# Patient Record
Sex: FEMALE | Race: WHITE | NOT HISPANIC OR LATINO | Employment: FULL TIME | URBAN - METROPOLITAN AREA
[De-identification: names, ages, dates, MRNs, and addresses within clinical notes are randomized per-mention and may not be internally consistent; named-entity substitution may affect disease eponyms.]

---
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
## 2023-04-04 ENCOUNTER — OFFICE VISIT (OUTPATIENT)
Dept: URGENT CARE | Facility: CLINIC | Age: 18
End: 2023-04-04
Payer: MEDICAID

## 2023-04-04 VITALS
RESPIRATION RATE: 18 BRPM | WEIGHT: 190 LBS | BODY MASS INDEX: 31.65 KG/M2 | SYSTOLIC BLOOD PRESSURE: 138 MMHG | HEIGHT: 65 IN | TEMPERATURE: 99 F | DIASTOLIC BLOOD PRESSURE: 91 MMHG | HEART RATE: 90 BPM | OXYGEN SATURATION: 99 %

## 2023-04-04 DIAGNOSIS — R11.2 NAUSEA AND VOMITING, UNSPECIFIED VOMITING TYPE: ICD-10-CM

## 2023-04-04 DIAGNOSIS — R10.31 RLQ ABDOMINAL PAIN: Primary | ICD-10-CM

## 2023-04-04 LAB
B-HCG UR QL: NEGATIVE
BILIRUB UR QL STRIP: NEGATIVE
CTP QC/QA: YES
GLUCOSE UR QL STRIP: NEGATIVE
KETONES UR QL STRIP: NEGATIVE
LEUKOCYTE ESTERASE UR QL STRIP: NEGATIVE
PH, POC UA: 8 (ref 5–8)
POC BLOOD, URINE: NEGATIVE
POC NITRATES, URINE: NEGATIVE
PROT UR QL STRIP: NEGATIVE
SP GR UR STRIP: 1 (ref 1–1.03)
UROBILINOGEN UR STRIP-ACNC: NORMAL (ref 0.1–1.1)

## 2023-04-04 PROCEDURE — 99215 OFFICE O/P EST HI 40 MIN: CPT | Mod: S$GLB,,, | Performed by: PHYSICIAN ASSISTANT

## 2023-04-04 PROCEDURE — 81025 POCT URINE PREGNANCY: ICD-10-PCS | Mod: S$GLB,,, | Performed by: PHYSICIAN ASSISTANT

## 2023-04-04 PROCEDURE — 81003 URINALYSIS AUTO W/O SCOPE: CPT | Mod: QW,S$GLB,, | Performed by: PHYSICIAN ASSISTANT

## 2023-04-04 PROCEDURE — 81025 URINE PREGNANCY TEST: CPT | Mod: S$GLB,,, | Performed by: PHYSICIAN ASSISTANT

## 2023-04-04 PROCEDURE — 81003 POCT URINALYSIS, DIPSTICK, AUTOMATED, W/O SCOPE: ICD-10-PCS | Mod: QW,S$GLB,, | Performed by: PHYSICIAN ASSISTANT

## 2023-04-04 PROCEDURE — 99215 PR OFFICE/OUTPT VISIT, EST, LEVL V, 40-54 MIN: ICD-10-PCS | Mod: S$GLB,,, | Performed by: PHYSICIAN ASSISTANT

## 2023-04-05 NOTE — PROGRESS NOTES
"Subjective:      Patient ID: Brenda Perez is a 17 y.o. female.    Vitals:  height is 5' 5" (1.651 m) and weight is 86.2 kg (190 lb). Her oral temperature is 99 °F (37.2 °C). Her blood pressure is 138/91 (abnormal) and her pulse is 90. Her respiration is 18 and oxygen saturation is 99%.     Chief Complaint: Abdominal Pain    Pt complaisn of abdominal pain on the right side of her abdomen. Pain started a few days ago and is not improving. Pt says she has been vomitting and having diarrhea the last few days on and off. Pt also states pain is worse when she palpates her stomach.    Abdominal Pain  This is a new problem. The current episode started in the past 7 days. The onset quality is sudden. The problem has been gradually worsening. The pain is located in the RLQ, RUQ and right flank. The pain is at a severity of 6/10. The pain is moderate. The quality of the pain is burning (stabbing). The abdominal pain radiates to the back. Associated symptoms include diarrhea and vomiting. Pertinent negatives include no anorexia, arthralgias, belching, constipation, dysuria, fever, flatus, frequency, headaches, hematochezia, hematuria, melena, myalgias, nausea or weight loss. The pain is aggravated by palpation. The pain is relieved by Nothing. She has tried acetaminophen for the symptoms. The treatment provided no relief. There is no history of abdominal surgery, colon cancer, Crohn's disease, gallstones, GERD, irritable bowel syndrome, pancreatitis, PUD or ulcerative colitis. Patient's medical history does not include kidney stones and UTI.     Constitution: Negative for fever.   Gastrointestinal:  Positive for abdominal pain, vomiting and diarrhea. Negative for history of abdominal surgery, nausea, constipation and bright red blood in stool.   Genitourinary:  Negative for dysuria, frequency and hematuria.   Musculoskeletal:  Negative for joint pain and muscle ache.   Neurological:  Negative for headaches.    Objective: "     Physical Exam   Constitutional: She is oriented to person, place, and time. She appears well-developed. She is cooperative.  Non-toxic appearance. She does not appear ill. No distress.   HENT:   Head: Normocephalic and atraumatic.   Ears:   Right Ear: Hearing normal.   Left Ear: Hearing normal.   Eyes: Conjunctivae and lids are normal. No scleral icterus.   Neck: Phonation normal. Neck supple.   Cardiovascular: Normal rate.   Pulmonary/Chest: Effort normal. No respiratory distress.   Abdominal: Normal appearance. She exhibits no distension and no mass. Soft. There is abdominal tenderness. There is guarding (voluntary) and tenderness at McBurney's point. There is no rebound, no left CVA tenderness and no right CVA tenderness. No hernia.     Neurological: She is alert and oriented to person, place, and time. Coordination normal.   Skin: Skin is intact, not diaphoretic and not pale.   Psychiatric: Her speech is normal and behavior is normal. Judgment and thought content normal.   Nursing note and vitals reviewed.    Assessment:     1. RLQ abdominal pain    2. Nausea and vomiting, unspecified vomiting type        Plan:       RLQ abdominal pain  -     POCT Urinalysis, Dipstick, Automated, W/O Scope  -     POCT urine pregnancy  -     Refer to Emergency Dept.    Nausea and vomiting, unspecified vomiting type  -     Refer to Emergency Dept.      Results for orders placed or performed in visit on 04/04/23   POCT Urinalysis, Dipstick, Automated, W/O Scope   Result Value Ref Range    POC Blood, Urine Negative Negative    POC Bilirubin, Urine Negative Negative    POC Urobilinogen, Urine norm 0.1 - 1.1    POC Ketones, Urine Negative Negative    POC Protein, Urine Negative Negative    POC Nitrates, Urine Negative Negative    POC Glucose, Urine Negative Negative    pH, UA 8.0 5 - 8    POC Specific Gravity, Urine 1.005 1.003 - 1.029    POC Leukocytes, Urine Negative Negative   POCT urine pregnancy   Result Value Ref Range     POC Preg Test, Ur Negative Negative     Acceptable Yes          Medical Decision Making:   History:   I obtained history from: someone other than patient.       <> Summary of History: mother  Clinical Tests:   Lab Tests: Ordered and Reviewed  The following lab test(s) were unremarkable: UPT and Urinalysis       <> Summary of Lab: UPT and UA WNL     Concerns for possible appendicitis. Recommend further evaluation and treatment for your RLQ abdominal pain. Due to the high risk of complications the patient is being sent to the emergency room for further evaluation, treatment and possible hospitalization.

## 2023-10-26 PROBLEM — R10.2 PELVIC PAIN IN FEMALE: Status: ACTIVE | Noted: 2023-10-26

## 2024-01-23 ENCOUNTER — OFFICE VISIT (OUTPATIENT)
Dept: URGENT CARE | Facility: CLINIC | Age: 19
End: 2024-01-23
Payer: MEDICAID

## 2024-01-23 VITALS
HEIGHT: 66 IN | SYSTOLIC BLOOD PRESSURE: 126 MMHG | HEART RATE: 66 BPM | OXYGEN SATURATION: 98 % | DIASTOLIC BLOOD PRESSURE: 84 MMHG | TEMPERATURE: 98 F | RESPIRATION RATE: 19 BRPM | WEIGHT: 182.13 LBS | BODY MASS INDEX: 29.27 KG/M2

## 2024-01-23 DIAGNOSIS — J02.9 VIRAL PHARYNGITIS: Primary | ICD-10-CM

## 2024-01-23 LAB
CTP QC/QA: YES
MOLECULAR STREP A: NEGATIVE

## 2024-01-23 PROCEDURE — 99214 OFFICE O/P EST MOD 30 MIN: CPT | Mod: S$GLB,,, | Performed by: NURSE PRACTITIONER

## 2024-01-23 PROCEDURE — 87651 STREP A DNA AMP PROBE: CPT | Mod: QW,S$GLB,, | Performed by: NURSE PRACTITIONER

## 2024-01-23 NOTE — PATIENT INSTRUCTIONS
1.  Take all medications as directed. If you have been prescribed antibiotics, make sure to complete them.   2.  Rest and keep yourself/patient well hydrated. For adults, it is recommended to drink at least 8-10 glasses of water daily.   3.  For patients above 6 months of age who are not allergic to and are not on anticoagulants, you can alternate Tylenol and Motrin every 4-6 hours for fever above 100.4F and/or pain.  For patients less than 6 months of age, allergic to or intolerant to NSAIDS, have gastritis, gastric ulcers, or history of GI bleeds, are pregnant, or are on anticoagulant therapy, you can take Tylenol every 4 hours as needed for fever above 100.4F and/or pain.   4. You should schedule a follow-up appointment with your Primary Care Provider/Pediatrician for recheck in 2-3 days or as directed at this visit.   5.  If your condition fails to improve in a timely manner, you should receive another evaluation by your Primary Care Provider/Pediatrician to discuss your concerns or return to urgent care for a recheck.  If your condition worsens at any time, you should report immediately to your nearest Emergency Department for further evaluation. **You must understand that you have received Urgent Care treatment only and that you may be released before all of your medical problems are known or treated. You, the patient, are responsible to arrange for follow-up care as instructed.     Patient Education       Viral Pharyngitis Discharge Instructions   About this topic   Viral pharyngitis is a sore throat which is likely caused by a virus. Most of the time, a sore throat will go away without antibiotics in a week or two. If you have strep throat, which is caused by bacteria, you will need to take an antibiotic.           What care is needed at home?   Ask your doctor what you need to do when you go home. Make sure you ask questions if you do not understand what the doctor says.  To help ease a sore throat you  can:  Use a sore throat spray.  Suck on hard candy or throat lozenges.  Gargle with warm saltwater a few times each day. Mix 1/2  teaspoon (2.5 grams) salt in 8 ounces (240 mL) of warm water  You may want to take medicine like acetaminophen, ibuprofen, or naproxen for pain.  If you decide to take over-the-counter cough or cold medicines, follow the directions on the label carefully. Be sure you do not take more than one medicine that contains acetaminophen. Also, if you have a heart problem or high blood pressure, check with your doctor before you take any of these medicines.  Wash your hands often. This will help keep others healthy.  Do not smoke. Stay away from others who are smoking.  What follow-up care is needed?   Your doctor may ask you to make visits to the office to check on your progress. Be sure to keep these visits.  What drugs may be needed?   The doctor may order drugs to:   Help with pain   Soothe the throat  Lower fever  Will physical activity be limited?   You may need to rest at home for 1 to 2 days or until you are feeling well.   What changes to diet are needed?   If your throat feels too sore to eat solid foods, you may drink water, juice, milk, milkshakes, or soups.  Do not drink sports drinks, soft drinks, or drinks with a lot of sugar. These may cause fluid loss and bother your throat.  Stay away from caffeine; acidic juices like orange juice or lemonade; and beer, wine, and mixed drinks (alcohol). These can worsen your signs.  What problems could happen?   Ear or sinus infection  Asthma attack  Lung problems like pneumonia or bronchitis  Abscess in the throat  Very bad fluid loss. This is dehydration.  What can be done to prevent this health problem?   Wash your hands often with soap and water for at least 20 seconds, especially after coughing or sneezing. Alcohol-based hand sanitizers also work to kill germs.  If you are sick, cover your mouth and nose with tissue when you cough or sneeze.  You can also cough or sneeze into your elbow. Throw away tissues in the trash and wash your hands after touching used tissues.  Do not get too close (kissing, hugging) to people who are sick.  Avoid going to crowded places. Avoid touching your eyes, mouth, or nose if you are around people.  Avoid sharing your towels or hankies with anyone who is sick. Clean often handled things like door handles, remotes, toys, and phones. Wipe them with a disinfectant.  Do not share knives and forks or drinking glasses with someone who has a sore throat. Wash these objects with hot, soapy water.  Get at least 6 to 8 hours of sleep each night.  Get a flu shot each year.  When do I need to call the doctor?   You have trouble breathing.  Your neck, tongue, or throat is swelling.  You are drooling because you cannot swallow your saliva.  You have very bad pain in your throat that keeps you from eating or drinking anything.  There are large, painful lumps in your neck.  You have blisters in the back of your throat.  Teach Back: Helping You Understand   The Teach Back Method helps you understand the information we are giving you. After you talk with the staff, tell them in your own words what you learned. This helps to make sure the staff has described each thing clearly. It also helps to explain things that may have been confusing. Before going home, make sure you can do these:  I can tell you about my condition.  I can tell you what may help ease my sore throat.  I can tell you what I can do to help avoid passing the infection to others.  I can tell you what I will do if I have trouble breathing, drooling, or swelling of the throat, tongue, or neck.  Where can I learn more?   American Academy of Otolaryngology - Head and Neck Surgery  https://www.enthealth.org/conditions/sore-throats/   CDC  https://www.cdc.gov/antibiotic-use/community/for-patients/common-illnesses/sore-throat.html   NHS  Choices  http://www.nhs.uk/conditions/Sore-throat/Pages/Introduction.aspx   Last Reviewed Date   2021-06-22  Consumer Information Use and Disclaimer   This information is not specific medical advice and does not replace information you receive from your health care provider. This is only a brief summary of general information. It does NOT include all information about conditions, illnesses, injuries, tests, procedures, treatments, therapies, discharge instructions or life-style choices that may apply to you. You must talk with your health care provider for complete information about your health and treatment options. This information should not be used to decide whether or not to accept your health care providers advice, instructions or recommendations. Only your health care provider has the knowledge and training to provide advice that is right for you.   Copyright   Copyright © 2021 [employer] and its affiliates and/or licensors. All rights reserved.

## 2024-01-23 NOTE — LETTER
January 23, 2024      [facility]  [address], [address]  [place] [address]  Phone: [phone]  Fax: [phone]       Patient: Brenda Perez   YOB: 2005  Date of Visit: 01/23/2024    To Whom It May Concern:    Candis Perez  was at [facility] on 01/23/2024. She may return to work/school on 01/24/2024 with no restrictions. If you have any questions or concerns, or if I can be of further assistance, please do not hesitate to contact me.    Sincerely,      Terrie Prado, NP

## 2024-01-23 NOTE — PROGRESS NOTES
"Subjective:      Patient ID: Brenda Perez is a 18 y.o. female.    Vitals:  height is 5' 6" (1.676 m) and weight is 82.6 kg (182 lb 1.6 oz). Her oral temperature is 98.4 °F (36.9 °C). Her blood pressure is 126/84 and her pulse is 66. Her respiration is 19 and oxygen saturation is 98%.     Chief Complaint: Sore Throat (Sore throat & cough - Entered by patient)    18 year old female reports sore throat for one day. She reports testing positive for strep and covid on January 12th. Pt reports she felt better initially, symptoms returned yesterday.     Sore Throat   This is a new problem. The current episode started yesterday. The problem has been unchanged. She has tried nothing for the symptoms.       Constitution: Negative.   HENT:  Positive for sore throat.    Neck: neck negative.   Cardiovascular: Negative.    Respiratory: Negative.        Objective:     Physical Exam   Constitutional: She is oriented to person, place, and time. She appears well-developed. She is cooperative.  Non-toxic appearance. She does not appear ill. No distress.   HENT:   Head: Normocephalic and atraumatic.   Ears:   Right Ear: Hearing, tympanic membrane, external ear and ear canal normal.   Left Ear: Hearing, tympanic membrane, external ear and ear canal normal.   Nose: Nose normal. No mucosal edema, rhinorrhea or nasal deformity. No epistaxis. Right sinus exhibits no maxillary sinus tenderness and no frontal sinus tenderness. Left sinus exhibits no maxillary sinus tenderness and no frontal sinus tenderness.   Mouth/Throat: Uvula is midline, oropharynx is clear and moist and mucous membranes are normal. No trismus in the jaw. Normal dentition. No uvula swelling. Cobblestoning (thick, yellow) present. No oropharyngeal exudate, posterior oropharyngeal edema or posterior oropharyngeal erythema.   Eyes: Conjunctivae and lids are normal. No scleral icterus.   Neck: Trachea normal and phonation normal. Neck supple. No edema present. No " erythema present. No neck rigidity present.   Cardiovascular: Normal rate, regular rhythm, normal heart sounds and normal pulses.   Pulmonary/Chest: Effort normal and breath sounds normal. No respiratory distress. She has no decreased breath sounds. She has no rhonchi.   Abdominal: Normal appearance.   Musculoskeletal: Normal range of motion.         General: No deformity. Normal range of motion.   Neurological: She is alert and oriented to person, place, and time. She exhibits normal muscle tone. Coordination normal.   Skin: Skin is warm, dry, intact, not diaphoretic and not pale.   Psychiatric: Her speech is normal and behavior is normal. Judgment and thought content normal.   Nursing note and vitals reviewed.      Assessment:     1. Viral pharyngitis        Plan:       Viral pharyngitis  -     POCT Strep A, Molecular      Results for orders placed or performed in visit on 01/23/24   POCT Strep A, Molecular   Result Value Ref Range    Molecular Strep A, POC Negative Negative     Acceptable Yes      Pt advised to use tylenol or motrin as directed. Mucinex otc as directed. Return if needed.    Patient Instructions   1.  Take all medications as directed. If you have been prescribed antibiotics, make sure to complete them.   2.  Rest and keep yourself/patient well hydrated. For adults, it is recommended to drink at least 8-10 glasses of water daily.   3.  For patients above 6 months of age who are not allergic to and are not on anticoagulants, you can alternate Tylenol and Motrin every 4-6 hours for fever above 100.4F and/or pain.  For patients less than 6 months of age, allergic to or intolerant to NSAIDS, have gastritis, gastric ulcers, or history of GI bleeds, are pregnant, or are on anticoagulant therapy, you can take Tylenol every 4 hours as needed for fever above 100.4F and/or pain.   4. You should schedule a follow-up appointment with your Primary Care Provider/Pediatrician for recheck in 2-3  days or as directed at this visit.   5.  If your condition fails to improve in a timely manner, you should receive another evaluation by your Primary Care Provider/Pediatrician to discuss your concerns or return to urgent care for a recheck.  If your condition worsens at any time, you should report immediately to your nearest Emergency Department for further evaluation. **You must understand that you have received Urgent Care treatment only and that you may be released before all of your medical problems are known or treated. You, the patient, are responsible to arrange for follow-up care as instructed.     Patient Education       Viral Pharyngitis Discharge Instructions   About this topic   Viral pharyngitis is a sore throat which is likely caused by a virus. Most of the time, a sore throat will go away without antibiotics in a week or two. If you have strep throat, which is caused by bacteria, you will need to take an antibiotic.           What care is needed at home?   Ask your doctor what you need to do when you go home. Make sure you ask questions if you do not understand what the doctor says.  To help ease a sore throat you can:  Use a sore throat spray.  Suck on hard candy or throat lozenges.  Gargle with warm saltwater a few times each day. Mix 1/2  teaspoon (2.5 grams) salt in 8 ounces (240 mL) of warm water  You may want to take medicine like acetaminophen, ibuprofen, or naproxen for pain.  If you decide to take over-the-counter cough or cold medicines, follow the directions on the label carefully. Be sure you do not take more than one medicine that contains acetaminophen. Also, if you have a heart problem or high blood pressure, check with your doctor before you take any of these medicines.  Wash your hands often. This will help keep others healthy.  Do not smoke. Stay away from others who are smoking.  What follow-up care is needed?   Your doctor may ask you to make visits to the office to check on your  progress. Be sure to keep these visits.  What drugs may be needed?   The doctor may order drugs to:   Help with pain   Soothe the throat  Lower fever  Will physical activity be limited?   You may need to rest at home for 1 to 2 days or until you are feeling well.   What changes to diet are needed?   If your throat feels too sore to eat solid foods, you may drink water, juice, milk, milkshakes, or soups.  Do not drink sports drinks, soft drinks, or drinks with a lot of sugar. These may cause fluid loss and bother your throat.  Stay away from caffeine; acidic juices like orange juice or lemonade; and beer, wine, and mixed drinks (alcohol). These can worsen your signs.  What problems could happen?   Ear or sinus infection  Asthma attack  Lung problems like pneumonia or bronchitis  Abscess in the throat  Very bad fluid loss. This is dehydration.  What can be done to prevent this health problem?   Wash your hands often with soap and water for at least 20 seconds, especially after coughing or sneezing. Alcohol-based hand sanitizers also work to kill germs.  If you are sick, cover your mouth and nose with tissue when you cough or sneeze. You can also cough or sneeze into your elbow. Throw away tissues in the trash and wash your hands after touching used tissues.  Do not get too close (kissing, hugging) to people who are sick.  Avoid going to crowded places. Avoid touching your eyes, mouth, or nose if you are around people.  Avoid sharing your towels or hankies with anyone who is sick. Clean often handled things like door handles, remotes, toys, and phones. Wipe them with a disinfectant.  Do not share knives and forks or drinking glasses with someone who has a sore throat. Wash these objects with hot, soapy water.  Get at least 6 to 8 hours of sleep each night.  Get a flu shot each year.  When do I need to call the doctor?   You have trouble breathing.  Your neck, tongue, or throat is swelling.  You are drooling because you  cannot swallow your saliva.  You have very bad pain in your throat that keeps you from eating or drinking anything.  There are large, painful lumps in your neck.  You have blisters in the back of your throat.  Teach Back: Helping You Understand   The Teach Back Method helps you understand the information we are giving you. After you talk with the staff, tell them in your own words what you learned. This helps to make sure the staff has described each thing clearly. It also helps to explain things that may have been confusing. Before going home, make sure you can do these:  I can tell you about my condition.  I can tell you what may help ease my sore throat.  I can tell you what I can do to help avoid passing the infection to others.  I can tell you what I will do if I have trouble breathing, drooling, or swelling of the throat, tongue, or neck.  Where can I learn more?   American Academy of Otolaryngology - Head and Neck Surgery  https://www.enthealth.org/conditions/sore-throats/   CDC  https://www.cdc.gov/antibiotic-use/community/for-patients/common-illnesses/sore-throat.html   NHS Choices  http://www.nhs.uk/conditions/Sore-throat/Pages/Introduction.aspx   Last Reviewed Date   2021-06-22  Consumer Information Use and Disclaimer   This information is not specific medical advice and does not replace information you receive from your health care provider. This is only a brief summary of general information. It does NOT include all information about conditions, illnesses, injuries, tests, procedures, treatments, therapies, discharge instructions or life-style choices that may apply to you. You must talk with your health care provider for complete information about your health and treatment options. This information should not be used to decide whether or not to accept your health care providers advice, instructions or recommendations. Only your health care provider has the knowledge and training to provide advice that  is right for you.   Copyright   Copyright © 2021 [employer] and its affiliates and/or licensors. All rights reserved.

## 2024-06-03 LAB
ABO + RH BLD: NORMAL
ANTIBODY SCREEN: NEGATIVE
C TRACH RRNA SPEC QL PROBE: NEGATIVE
GONORRHEA: NEGATIVE
HBV SURFACE AG SERPL QL IA: NEGATIVE
HCT VFR BLD AUTO: 42.6 %
HGB BLD-MCNC: 12.9 G/DL
HIV 1+2 AB+HIV1 P24 AG SERPL QL IA: NORMAL
MCV RBC AUTO: 92 FL (ref 82–108)
PLATELET # BLD AUTO: 326 K/UL (ref 150–399)
RPR: NON REACTIVE
RUBELLA IGG SCREEN: NORMAL

## 2024-08-16 ENCOUNTER — TELEPHONE (OUTPATIENT)
Dept: OBSTETRICS AND GYNECOLOGY | Facility: CLINIC | Age: 19
End: 2024-08-16
Payer: MEDICAID

## 2024-08-16 NOTE — TELEPHONE ENCOUNTER
----- Message from Elizabeth Herring sent at 8/16/2024  9:53 AM CDT -----  Contact: Self  Brenda Perez  MRN: [id number]  Home Phone      [phone]  Work Phone      Not on file.  Mobile          [phone]    Patient Care Team:  Dennys Feng MD as PCP - General (Family Medicine)  Rui Miller Jr., MD as Consulting Physician (Obstetrics and Gynecology)  OB? Yes, Unknown  What phone number can you be reached at? [phone]  Message: pt is currently 16 wks pregnant. Would like to transfer care to Dr Bridges

## 2024-08-16 NOTE — TELEPHONE ENCOUNTER
Pt called requesting an appt with Dr. Bridges, pt is a transfer from Dr. Crowley, pt has been scheduled and she v/u.

## 2024-08-19 ENCOUNTER — TELEPHONE (OUTPATIENT)
Dept: OBSTETRICS AND GYNECOLOGY | Facility: CLINIC | Age: 19
End: 2024-08-19

## 2024-08-19 ENCOUNTER — OFFICE VISIT (OUTPATIENT)
Dept: OBSTETRICS AND GYNECOLOGY | Facility: CLINIC | Age: 19
End: 2024-08-19
Payer: MEDICAID

## 2024-08-19 ENCOUNTER — TELEPHONE (OUTPATIENT)
Dept: OBSTETRICS AND GYNECOLOGY | Facility: CLINIC | Age: 19
End: 2024-08-19
Payer: MEDICAID

## 2024-08-19 VITALS
HEART RATE: 88 BPM | SYSTOLIC BLOOD PRESSURE: 126 MMHG | WEIGHT: 190.06 LBS | BODY MASS INDEX: 30.54 KG/M2 | DIASTOLIC BLOOD PRESSURE: 84 MMHG | HEIGHT: 66 IN

## 2024-08-19 DIAGNOSIS — O10.919 CHRONIC HYPERTENSION AFFECTING PREGNANCY: ICD-10-CM

## 2024-08-19 DIAGNOSIS — Z32.01 PREGNANCY EXAMINATION OR TEST, POSITIVE RESULT: Primary | ICD-10-CM

## 2024-08-19 DIAGNOSIS — O23.12 ACUTE CYSTITIS DURING PREGNANCY IN SECOND TRIMESTER: ICD-10-CM

## 2024-08-19 PROCEDURE — 3008F BODY MASS INDEX DOCD: CPT | Mod: CPTII,,, | Performed by: OBSTETRICS & GYNECOLOGY

## 2024-08-19 PROCEDURE — 99999 PR PBB SHADOW E&M-EST. PATIENT-LVL III: CPT | Mod: PBBFAC,,, | Performed by: OBSTETRICS & GYNECOLOGY

## 2024-08-19 PROCEDURE — 3074F SYST BP LT 130 MM HG: CPT | Mod: CPTII,,, | Performed by: OBSTETRICS & GYNECOLOGY

## 2024-08-19 PROCEDURE — 99204 OFFICE O/P NEW MOD 45 MIN: CPT | Mod: S$PBB,TH,, | Performed by: OBSTETRICS & GYNECOLOGY

## 2024-08-19 PROCEDURE — 3079F DIAST BP 80-89 MM HG: CPT | Mod: CPTII,,, | Performed by: OBSTETRICS & GYNECOLOGY

## 2024-08-19 PROCEDURE — 1159F MED LIST DOCD IN RCRD: CPT | Mod: CPTII,,, | Performed by: OBSTETRICS & GYNECOLOGY

## 2024-08-19 PROCEDURE — 1160F RVW MEDS BY RX/DR IN RCRD: CPT | Mod: CPTII,,, | Performed by: OBSTETRICS & GYNECOLOGY

## 2024-08-19 PROCEDURE — 99213 OFFICE O/P EST LOW 20 MIN: CPT | Mod: PBBFAC,TH | Performed by: OBSTETRICS & GYNECOLOGY

## 2024-08-19 RX ORDER — NIFEDIPINE 60 MG/1
TABLET, EXTENDED RELEASE ORAL
COMMUNITY

## 2024-08-19 RX ORDER — ONDANSETRON 4 MG/1
4 TABLET, ORALLY DISINTEGRATING ORAL
COMMUNITY
Start: 2024-07-23

## 2024-08-19 RX ORDER — NITROFURANTOIN 25; 75 MG/1; MG/1
100 CAPSULE ORAL 2 TIMES DAILY
Qty: 14 CAPSULE | Refills: 0 | Status: SHIPPED | OUTPATIENT
Start: 2024-08-19 | End: 2024-08-26

## 2024-08-19 RX ORDER — NAPROXEN SODIUM 220 MG/1
81 TABLET, FILM COATED ORAL DAILY
Qty: 30 TABLET | Refills: 11 | Status: SHIPPED | OUTPATIENT
Start: 2024-08-19 | End: 2025-08-19

## 2024-08-19 NOTE — PROGRESS NOTES
Subjective:   Patient ID: Brenda Perez is a 19 y.o. y.o. female.     Chief Complaint: Missed Menses       History of Present Illness:    Brenda presents today complaining of amenorrhea. She started having pain and some spotting with wiping today.  Patient's last menstrual period was 2024 (exact date).. Prior menstrual cycles have been regular. She reports  cramping and spotting as above . UPT is positive.     She had started PNC with [facility].   She reports EDC of 25    Past Medical History:   Diagnosis Date    COVID-2021    Hypertension     WITH PREGNANCY- MONITORING    Pregnancy      (vaginal delivery)    Seizures     childhood, grew out of them, per pt     Past Surgical History:   Procedure Laterality Date    DIAGNOSTIC LAPAROSCOPY N/A 10/26/2023    Procedure: LAPAROSCOPY, DIAGNOSTIC;  Surgeon: Rui Miller Jr., MD;  Location: [facility] OR;  Service: OB/GYN;  Laterality: N/A;  To follow 1st case per Dr. Grant    FULGURATION OF ENDOMETRIOSIS  10/26/2023    Procedure: FULGURATION, ENDOMETRIOSIS;  Surgeon: Rui Miller Jr., MD;  Location: [facility] OR;  Service: OB/GYN;;     Social History     Socioeconomic History    Marital status: Single   Tobacco Use    Smoking status: Never    Smokeless tobacco: Never   Substance and Sexual Activity    Alcohol use: Yes     Comment: occasional- not during pregnancy    Drug use: Never    Sexual activity: Yes     Partners: Male     Birth control/protection: None     Comment: single     Family History   Problem Relation Name Age of Onset    Colon cancer Maternal Grandfather      No Known Problems Father      No Known Problems Mother      No Known Problems Sister      Breast cancer Neg Hx      Ovarian cancer Neg Hx       OB History    Para Term  AB Living   3 2 2     2   SAB IAB Ectopic Multiple Live Births         0 2      # Outcome Date GA Lbr Mac/2nd Weight Sex Type Anes PTL Lv   3 Current            2 Term 10/31/22 37w0d / 00:07 3.062 kg (6  lb 12 oz) M Vag-Spont EPI N ALMITA   1 Term 03/08/21 37w1d / 01:25 3.16 kg (6 lb 15.5 oz) M Vag-Spont EPI N ALMITA         ROS:   Review of Systems   Constitutional:  Negative for chills, diaphoresis, fatigue, fever and unexpected weight change.   HENT:  Negative for congestion, hearing loss, postnasal drip, rhinorrhea, sinus pressure, sinus pain, sore throat and tinnitus.    Eyes:  Negative for pain, discharge and visual disturbance.   Respiratory:  Negative for apnea, cough, shortness of breath and wheezing.    Cardiovascular:  Negative for chest pain, palpitations and leg swelling.   Gastrointestinal:  Negative for abdominal pain, constipation, diarrhea, nausea and vomiting.   Endocrine: Negative for cold intolerance, heat intolerance, polydipsia, polyphagia and polyuria.   Genitourinary:  Positive for dysuria, flank pain, frequency and hematuria. Negative for difficulty urinating, dyspareunia, enuresis, genital sores, menstrual problem, pelvic pain, urgency, vaginal bleeding, vaginal discharge and vaginal pain.   Musculoskeletal:  Negative for arthralgias, back pain, joint swelling, myalgias, neck pain and neck stiffness.   Skin:  Negative for color change, pallor and rash.   Allergic/Immunologic: Negative for environmental allergies, food allergies and immunocompromised state.   Neurological:  Negative for dizziness, weakness, light-headedness, numbness and headaches.   Hematological:  Negative for adenopathy. Does not bruise/bleed easily.   Psychiatric/Behavioral:  Negative for agitation and confusion. The patient is not nervous/anxious.            Objective:   Vital Signs:  Vitals:    08/19/24 1432   BP: 126/84   Pulse: 88       Physical Exam:  General:  alert,normal appearing gravid female   Head: Normocephalic, atraumatic   Neck: Supple, Normal ROM   Respiratory: Normal effort   Neuro/Psych: Alert and oriented, appropriate mood and affect   Abdomen:   Soft, right flank tender but no CVA tenderness; FHTs 160    Pelvis: External genitalia: normal general appearance  Vaginal: normal mucosa without prolapse or lesions, discharge, white  Cervix: normal appearance  Uterus:  gravid         Assessment:      1. Pregnancy examination or test, positive result    2. Acute cystitis during pregnancy in second trimester    3. Chronic hypertension affecting pregnancy          Plan:        Pregnancy examination or test, positive result  -     US OB/GYN Procedure ([facility]) - Extended List; Future; Expected date: 09/19/2024    Acute cystitis during pregnancy in second trimester  -     nitrofurantoin, macrocrystal-monohydrate, (MACROBID) 100 MG capsule; Take 1 capsule (100 mg total) by mouth 2 (two) times daily. for 7 days  Dispense: 14 capsule; Refill: 0    Chronic hypertension affecting pregnancy  -     aspirin 81 MG Chew; Take 1 tablet (81 mg total) by mouth once daily.  Dispense: 30 tablet; Refill: 11        1. Pap is up to date - reviewed  2. Prenatal labs have been done, will request records.  3. Schedule Ultrasound - anatomy  4. Follow up in 4 weeks after ultrasound.    On Procardia 60XL daily    Patient was counseled today on proper weight gain based on the [employer]'s recommendations based on her pre-pregnancy weight. Discussed foods to avoid in pregnancy (i.e. sushi, fish that are high in mercury, deli meat, and unpasteurized cheeses). Discussed prenatal vitamin options (i.e. stool softener, DHA). She was also counseled on safe, healthy behavior as well as medications safe in pregnancy.

## 2024-08-19 NOTE — TELEPHONE ENCOUNTER
----- Message from Ramila Barrett MA sent at 8/19/2024  9:21 AM CDT -----  Contact: self  Brenda Perez  MRN: [id number]  Home Phone      [phone]  Work Phone      Not on file.  Mobile          [phone]    Patient Care Team:  Dennys Feng MD as PCP - General (Family Medicine)  Rui Miller Jr., MD as Consulting Physician (Obstetrics and Gynecology)  OB? No  What phone number can you be reached at? [phone]  Message: Needs to see about rescheduling appt that she has on Thursday.  Stated was offered appt for tomorrow, wants to know if that is still possible.

## 2024-08-19 NOTE — TELEPHONE ENCOUNTER
Pt was informed that the appt is no longer available and she stated she will keep her appt on Thursday.

## 2024-08-26 ENCOUNTER — E-VISIT (OUTPATIENT)
Dept: OBSTETRICS AND GYNECOLOGY | Facility: CLINIC | Age: 19
End: 2024-08-26
Payer: MEDICAID

## 2024-08-26 ENCOUNTER — TELEPHONE (OUTPATIENT)
Dept: OBSTETRICS AND GYNECOLOGY | Facility: CLINIC | Age: 19
End: 2024-08-26
Payer: MEDICAID

## 2024-08-26 DIAGNOSIS — O23.42 URINARY TRACT INFECTION IN MOTHER DURING SECOND TRIMESTER OF PREGNANCY: Primary | ICD-10-CM

## 2024-08-26 DIAGNOSIS — R10.9 ACUTE RIGHT FLANK PAIN: Primary | ICD-10-CM

## 2024-08-26 RX ORDER — CEPHALEXIN 750 MG/1
750 CAPSULE ORAL EVERY 12 HOURS
Qty: 14 CAPSULE | Refills: 0 | Status: SHIPPED | OUTPATIENT
Start: 2024-08-26 | End: 2024-09-02

## 2024-08-26 NOTE — TELEPHONE ENCOUNTER
----- Message from Elizabeth Herring sent at 8/26/2024  9:18 AM CDT -----  Contact: Self  Brenda Perez  MRN: [id number]  Home Phone      Not on file.  Work Phone      Not on file.  Mobile          [phone]    Patient Care Team:  Dennys Feng MD as PCP - General (Family Medicine)  Rui Miller Jr., MD as Consulting Physician (Obstetrics and Gynecology)  Marley Bridges MD as Consulting Physician (Obstetrics and Gynecology)  OB? Yes, 17w2d  What phone number can you be reached at? [phone]  Message: pt states she took all medication prescribed for UTI but did a home test, and still has it. Also states that she is having some pain in her right abdominal area towards back.

## 2024-08-26 NOTE — TELEPHONE ENCOUNTER
Pt called back and complained of right-sided flank pain and has completed macrobid that was sent in. Pt stated vaginal bleeding resolved since last visit and denied dysuria. Pt had positive at-home UTI test. Offered e-visit and pt accepted. Portal message sent with link and pt voiced understanding.

## 2024-08-26 NOTE — PROGRESS NOTES
Patient ID: Brenda Perez is a 19 y.o. female.    Chief Complaint: Urinary Tract Infection (Entered automatically based on patient selection in [employer].)    The patient initiated a request through [employer] on 8/26/2024 for evaluation and management with a chief complaint of Urinary Tract Infection (Entered automatically based on patient selection in [employer].)     I evaluated the questionnaire submission on 08/26/2024  .    [facility] Peq Evisit Uti Questionnaire    8/26/2024 10:20 AM CDT - Filed by Patient   Do you agree to participate in an E-Visit? Yes   If you have any of the following symptoms, please present to your local emergency room or call 911:  I acknowledge   Choose the state of your primary residence [place]   Are you pregnant, could you be pregnant, or are you breast feeding? Pregnant   What is the main issue you would like addressed today? I tested postive for a uti on the 19th . I was given nitrofurantoin , tan finished that . Having very bad side pain , cramping , and pain in lower back on right side . Tested myself with a uti strip this morning and came back postivie .   What symptoms do you currently have? Pain while passing urine;  Difficulty passing urine;  Change in urine appearance or smell   When did your symptoms first appear? 7/10/2024   List what you have done or taken to help your symptoms. Nitrofurantoin , and heating pack on side and back   Please indicate whether you have had the following symptoms during the past 24 hours     Urgent urination (a sudden and uncontrollable urge to urinate) None   Frequent urination of small amounts of urine (going to the toilet very often) Mild   Burning pain when urinating Mild   Incomplete bladder emptying (still feel like you need to urinate again after urination) Mild   Pain not associated with urination in the lower abdomen below the belly button) Mild   What does your urine look like? Cloudy   Blood seen in the urine None   Flank pain (pain in one  or both sides of the lower back) Severe   Abnormal Vaginal Discharge (abnormal amount, color and/or odor)    Discharge from the urethra (urinary opening) without urination Mild   High body temperature/fever? None-<99.5   Please rate how much discomfort you have experience because of the symptoms in the past 24 hours: Severe   Please indicate how the symptoms have interfered with your every day activities/work in the past 24 hours: Moderate   Please indicate how these symptoms have interfered with your social activities (visiting people, meeting with friends, etc.) in the past 24 hours? None   Are you a diabetic? No   Please indicate whether you have the following at the time of completion of this questionnaire: Pregnancy   Provide any additional information you feel is important.    Please attach any relevant images or files (if you have performed a home test for UTI, please submit a photo of results)    Are you able to take your vital signs? No         Encounter Diagnosis   Name Primary?    Urinary tract infection in mother during second trimester of pregnancy Yes        No orders of the defined types were placed in this encounter.     Medications Ordered This Encounter   Medications    cephALEXin (KEFLEX) 750 MG capsule     Sig: Take 1 capsule (750 mg total) by mouth every 12 (twelve) hours. for 7 days     Dispense:  14 capsule     Refill:  0        No follow-ups on file.      E-Visit Time Tracking:    Day 1 Time (in minutes): 7    Total Time (in minutes): 7

## 2024-09-17 ENCOUNTER — PROCEDURE VISIT (OUTPATIENT)
Dept: MATERNAL FETAL MEDICINE | Facility: CLINIC | Age: 19
End: 2024-09-17
Payer: MEDICAID

## 2024-09-17 ENCOUNTER — ROUTINE PRENATAL (OUTPATIENT)
Dept: OBSTETRICS AND GYNECOLOGY | Facility: CLINIC | Age: 19
End: 2024-09-17
Payer: MEDICAID

## 2024-09-17 VITALS
SYSTOLIC BLOOD PRESSURE: 128 MMHG | HEART RATE: 85 BPM | BODY MASS INDEX: 30.83 KG/M2 | DIASTOLIC BLOOD PRESSURE: 80 MMHG | WEIGHT: 191 LBS

## 2024-09-17 DIAGNOSIS — N89.8 VAGINAL DISCHARGE DURING PREGNANCY IN SECOND TRIMESTER: ICD-10-CM

## 2024-09-17 DIAGNOSIS — O26.892 VAGINAL DISCHARGE DURING PREGNANCY IN SECOND TRIMESTER: ICD-10-CM

## 2024-09-17 DIAGNOSIS — Z34.82 ENCOUNTER FOR SUPERVISION OF OTHER NORMAL PREGNANCY IN SECOND TRIMESTER: Primary | ICD-10-CM

## 2024-09-17 DIAGNOSIS — Z3A.20 20 WEEKS GESTATION OF PREGNANCY: ICD-10-CM

## 2024-09-17 DIAGNOSIS — Z32.01 PREGNANCY EXAMINATION OR TEST, POSITIVE RESULT: ICD-10-CM

## 2024-09-17 PROCEDURE — 81514 NFCT DS BV&VAGINITIS DNA ALG: CPT | Performed by: OBSTETRICS & GYNECOLOGY

## 2024-09-17 PROCEDURE — 99213 OFFICE O/P EST LOW 20 MIN: CPT | Mod: TH,S$PBB,, | Performed by: OBSTETRICS & GYNECOLOGY

## 2024-09-17 PROCEDURE — 99212 OFFICE O/P EST SF 10 MIN: CPT | Mod: PBBFAC,TH,25 | Performed by: OBSTETRICS & GYNECOLOGY

## 2024-09-17 PROCEDURE — 76805 OB US >/= 14 WKS SNGL FETUS: CPT | Mod: PBBFAC | Performed by: OBSTETRICS & GYNECOLOGY

## 2024-09-17 PROCEDURE — 99999 PR PBB SHADOW E&M-EST. PATIENT-LVL II: CPT | Mod: PBBFAC,,, | Performed by: OBSTETRICS & GYNECOLOGY

## 2024-09-17 NOTE — PROGRESS NOTES
Patient with pelvic pain and irritation, burning.  Vaginal discharge present, affirm collected.  Urine dip normal.      Denies vaginal bleeding or cramping.  Good FM. RTC in 4 weeks.     Anatomy today, needs follow up.     Need prenatal records from Dr. Miller.    Vitals signs, FHTs, urine dip, and PE findings documented, reviewed and available in OB flow chart.       I spent a total of 20 minutes on the day of the visit.This includes face to face time and non-face to face time preparing to see the patient (eg, review of tests), Obtaining and/or reviewing separately obtained history, Documenting clinical information in the electronic or other health record, Independently interpreting resultsand communicating results to the patient/family/caregiver, or Care coordination.     Coffective counseling sheet Learn Your Baby and Protect Breastfeeding discussed with mother. Instructed regarding feeding cues and methods to calm baby. Encouraged mother to download Coffective mobile terese if she has not already done so.  Mother verbalized understanding.

## 2024-09-20 ENCOUNTER — PATIENT MESSAGE (OUTPATIENT)
Dept: OBSTETRICS AND GYNECOLOGY | Facility: CLINIC | Age: 19
End: 2024-09-20
Payer: MEDICAID

## 2024-09-20 LAB
BACTERIAL VAGINOSIS DNA: NEGATIVE
CANDIDA GLABRATA DNA: NEGATIVE
CANDIDA KRUSEI DNA: NEGATIVE
CANDIDA RRNA VAG QL PROBE: NEGATIVE
T VAGINALIS RRNA GENITAL QL PROBE: NEGATIVE

## 2024-09-24 ENCOUNTER — PATIENT MESSAGE (OUTPATIENT)
Dept: OBSTETRICS AND GYNECOLOGY | Facility: CLINIC | Age: 19
End: 2024-09-24
Payer: MEDICAID

## 2024-10-09 ENCOUNTER — TELEPHONE (OUTPATIENT)
Dept: OBSTETRICS AND GYNECOLOGY | Facility: CLINIC | Age: 19
End: 2024-10-09
Payer: MEDICAID

## 2024-10-09 NOTE — TELEPHONE ENCOUNTER
Informed by  pt on phone with complaints of cramping and back pain since last night. Before transferring pt, pt hung up.    Pt was called and she complaints of intermittent cramping to lower stomach occasionally going into back starting last night. Pt denied urinary complaints. Pt stated she drank about 6 bottles of water since this morning, but can't drink too much because it would make her throw up. Pt denied vaginal bleeding and reported good fetal movement. Pt asked if she can try anything before being evaluated. Instructed pt to try increasing her water intake and wear maternity belt to see if symptoms improve/resolve. Pt instructed to contact clinic if symptoms do not improve, worsen, and/or she starts with vaginal bleeding. Pt voiced understanding.

## 2024-10-12 ENCOUNTER — PATIENT MESSAGE (OUTPATIENT)
Dept: OTHER | Facility: OTHER | Age: 19
End: 2024-10-12
Payer: MEDICAID

## 2024-10-15 ENCOUNTER — ROUTINE PRENATAL (OUTPATIENT)
Dept: OBSTETRICS AND GYNECOLOGY | Facility: CLINIC | Age: 19
End: 2024-10-15
Payer: MEDICAID

## 2024-10-15 VITALS
BODY MASS INDEX: 31.02 KG/M2 | DIASTOLIC BLOOD PRESSURE: 84 MMHG | SYSTOLIC BLOOD PRESSURE: 128 MMHG | HEART RATE: 91 BPM | WEIGHT: 192.19 LBS

## 2024-10-15 DIAGNOSIS — Z34.82 ENCOUNTER FOR SUPERVISION OF OTHER NORMAL PREGNANCY IN SECOND TRIMESTER: Primary | ICD-10-CM

## 2024-10-15 DIAGNOSIS — Z3A.24 24 WEEKS GESTATION OF PREGNANCY: ICD-10-CM

## 2024-10-15 PROCEDURE — 99999 PR PBB SHADOW E&M-EST. PATIENT-LVL II: CPT | Mod: PBBFAC,,, | Performed by: OBSTETRICS & GYNECOLOGY

## 2024-10-15 PROCEDURE — 99213 OFFICE O/P EST LOW 20 MIN: CPT | Mod: TH,S$PBB,, | Performed by: OBSTETRICS & GYNECOLOGY

## 2024-10-15 PROCEDURE — 99212 OFFICE O/P EST SF 10 MIN: CPT | Mod: PBBFAC,TH | Performed by: OBSTETRICS & GYNECOLOGY

## 2024-10-15 NOTE — PROGRESS NOTES
Patient with no complaints. Denies vaginal bleeding or contractions. Good FM. GTT/CBC/RPR and indirect jag (if applicable) ordered today.   labor precautions discussed with patient. RTC in 4 weeks.     Declines flu shot.    Vitals signs, FHTs, urine dip, and PE findings documented, reviewed and available in OB flow chart.       I spent a total of 20 minutes on the day of the visit.This includes face to face time and non-face to face time preparing to see the patient (eg, review of tests), Obtaining and/or reviewing separately obtained history, Documenting clinical information in the electronic or other health record, Independently interpreting resultsand communicating results to the patient/family/caregiver, or Care coordination.     Coffective counseling sheet Nourish discussed with mother. Reinforced basic breastfeeding position and latch as well as proper hand expression technique and avoidance of artificial nipples and formula unless medically indicated. Encouraged mother to download Coffective mobile terese if she has not already done so.  Mother verbalizes understanding.

## 2024-10-26 ENCOUNTER — PATIENT MESSAGE (OUTPATIENT)
Dept: OTHER | Facility: OTHER | Age: 19
End: 2024-10-26
Payer: MEDICAID

## 2024-10-29 ENCOUNTER — PATIENT MESSAGE (OUTPATIENT)
Dept: OBSTETRICS AND GYNECOLOGY | Facility: CLINIC | Age: 19
End: 2024-10-29
Payer: MEDICAID

## 2024-11-01 ENCOUNTER — PROCEDURE VISIT (OUTPATIENT)
Dept: MATERNAL FETAL MEDICINE | Facility: CLINIC | Age: 19
End: 2024-11-01
Payer: MEDICAID

## 2024-11-01 DIAGNOSIS — Z34.82 ENCOUNTER FOR SUPERVISION OF OTHER NORMAL PREGNANCY IN SECOND TRIMESTER: ICD-10-CM

## 2024-11-01 PROCEDURE — 76816 OB US FOLLOW-UP PER FETUS: CPT | Mod: PBBFAC | Performed by: OBSTETRICS & GYNECOLOGY

## 2024-11-02 DIAGNOSIS — Z36.2 ENCOUNTER FOR FOLLOW-UP ULTRASOUND OF FETAL ANATOMY: Primary | ICD-10-CM

## 2024-11-05 ENCOUNTER — PATIENT MESSAGE (OUTPATIENT)
Dept: OBSTETRICS AND GYNECOLOGY | Facility: CLINIC | Age: 19
End: 2024-11-05
Payer: MEDICAID

## 2024-11-09 ENCOUNTER — PATIENT MESSAGE (OUTPATIENT)
Dept: OTHER | Facility: OTHER | Age: 19
End: 2024-11-09
Payer: MEDICAID

## 2024-11-11 ENCOUNTER — PATIENT MESSAGE (OUTPATIENT)
Dept: OBSTETRICS AND GYNECOLOGY | Facility: CLINIC | Age: 19
End: 2024-11-11
Payer: MEDICAID

## 2024-11-12 ENCOUNTER — ROUTINE PRENATAL (OUTPATIENT)
Dept: OBSTETRICS AND GYNECOLOGY | Facility: CLINIC | Age: 19
End: 2024-11-12
Payer: MEDICAID

## 2024-11-12 VITALS
DIASTOLIC BLOOD PRESSURE: 86 MMHG | SYSTOLIC BLOOD PRESSURE: 134 MMHG | HEART RATE: 95 BPM | BODY MASS INDEX: 31.6 KG/M2 | WEIGHT: 195.81 LBS

## 2024-11-12 DIAGNOSIS — Z34.83 ENCOUNTER FOR SUPERVISION OF OTHER NORMAL PREGNANCY, THIRD TRIMESTER: Primary | ICD-10-CM

## 2024-11-12 DIAGNOSIS — Z3A.28 28 WEEKS GESTATION OF PREGNANCY: ICD-10-CM

## 2024-11-12 PROCEDURE — 99999 PR PBB SHADOW E&M-EST. PATIENT-LVL II: CPT | Mod: PBBFAC,,, | Performed by: OBSTETRICS & GYNECOLOGY

## 2024-11-12 PROCEDURE — 99212 OFFICE O/P EST SF 10 MIN: CPT | Mod: PBBFAC,TH | Performed by: OBSTETRICS & GYNECOLOGY

## 2024-11-12 PROCEDURE — 99213 OFFICE O/P EST LOW 20 MIN: CPT | Mod: TH,S$PBB,, | Performed by: OBSTETRICS & GYNECOLOGY

## 2024-11-12 NOTE — PROGRESS NOTES
Patient with no complaints. Denies vaginal bleeding or contractions. Good FM. Tdap discussed and ordered today.  Discussed fetal kick count instructions with patient to monitor fetal movement. RTC in 2 weeks.      Prenatals obtained  B+/-  Rubella non-immune  HIV negative  GC/CT negative   HepB negative    Will be scanned to medica.     Vitals signs, FHTs, urine dip, and PE findings documented, reviewed and available in OB flow chart.       I spent a total of 20 minutes on the day of the visit.This includes face to face time and non-face to face time preparing to see the patient (eg, review of tests), Obtaining and/or reviewing separately obtained history, Documenting clinical information in the electronic or other health record, Independently interpreting resultsand communicating results to the patient/family/caregiver, or Care coordination.     Coffective counseling sheet Keep Baby Close discussed with mother. Reinforced rooming in practices, continued skin to skin, and quiet hours as requested by mother.  Encouraged mother to download Coffective mobile terese if she has not already done so. Mother verbalizes understanding.

## 2024-11-15 ENCOUNTER — LAB VISIT (OUTPATIENT)
Dept: LAB | Facility: HOSPITAL | Age: 19
End: 2024-11-15
Attending: OBSTETRICS & GYNECOLOGY
Payer: MEDICAID

## 2024-11-15 DIAGNOSIS — Z34.82 ENCOUNTER FOR SUPERVISION OF OTHER NORMAL PREGNANCY IN SECOND TRIMESTER: ICD-10-CM

## 2024-11-15 LAB
BASOPHILS # BLD AUTO: 0.03 K/UL (ref 0–0.2)
BASOPHILS NFR BLD: 0.4 % (ref 0–1.9)
DIFFERENTIAL METHOD BLD: ABNORMAL
EOSINOPHIL # BLD AUTO: 0.2 K/UL (ref 0–0.5)
EOSINOPHIL NFR BLD: 1.8 % (ref 0–8)
ERYTHROCYTE [DISTWIDTH] IN BLOOD BY AUTOMATED COUNT: 13.4 % (ref 11.5–14.5)
GLUCOSE SERPL-MCNC: 99 MG/DL (ref 70–140)
HCT VFR BLD AUTO: 31.9 % (ref 37–48.5)
HGB BLD-MCNC: 10.9 G/DL (ref 12–16)
IMM GRANULOCYTES # BLD AUTO: 0.04 K/UL (ref 0–0.04)
IMM GRANULOCYTES NFR BLD AUTO: 0.5 % (ref 0–0.5)
LYMPHOCYTES # BLD AUTO: 2 K/UL (ref 1–4.8)
LYMPHOCYTES NFR BLD: 23 % (ref 18–48)
MCH RBC QN AUTO: 28.2 PG (ref 27–31)
MCHC RBC AUTO-ENTMCNC: 34.2 G/DL (ref 32–36)
MCV RBC AUTO: 83 FL (ref 82–98)
MONOCYTES # BLD AUTO: 0.6 K/UL (ref 0.3–1)
MONOCYTES NFR BLD: 6.7 % (ref 4–15)
NEUTROPHILS # BLD AUTO: 5.8 K/UL (ref 1.8–7.7)
NEUTROPHILS NFR BLD: 67.6 % (ref 38–73)
NRBC BLD-RTO: 0 /100 WBC
PLATELET # BLD AUTO: 192 K/UL (ref 150–450)
PMV BLD AUTO: 9.4 FL (ref 9.2–12.9)
RBC # BLD AUTO: 3.86 M/UL (ref 4–5.4)
TREPONEMA PALLIDUM IGG+IGM AB [PRESENCE] IN SERUM OR PLASMA BY IMMUNOASSAY: NONREACTIVE
WBC # BLD AUTO: 8.52 K/UL (ref 3.9–12.7)

## 2024-11-15 PROCEDURE — 85025 COMPLETE CBC W/AUTO DIFF WBC: CPT | Performed by: OBSTETRICS & GYNECOLOGY

## 2024-11-15 PROCEDURE — 36415 COLL VENOUS BLD VENIPUNCTURE: CPT | Performed by: OBSTETRICS & GYNECOLOGY

## 2024-11-15 PROCEDURE — 86593 SYPHILIS TEST NON-TREP QUANT: CPT | Performed by: OBSTETRICS & GYNECOLOGY

## 2024-11-15 PROCEDURE — 82950 GLUCOSE TEST: CPT | Performed by: OBSTETRICS & GYNECOLOGY

## 2024-11-23 ENCOUNTER — PATIENT MESSAGE (OUTPATIENT)
Dept: OTHER | Facility: OTHER | Age: 19
End: 2024-11-23
Payer: MEDICAID

## 2024-11-27 ENCOUNTER — ROUTINE PRENATAL (OUTPATIENT)
Dept: OBSTETRICS AND GYNECOLOGY | Facility: CLINIC | Age: 19
End: 2024-11-27
Payer: MEDICAID

## 2024-11-27 ENCOUNTER — LAB VISIT (OUTPATIENT)
Dept: LAB | Facility: HOSPITAL | Age: 19
End: 2024-11-27
Attending: OBSTETRICS & GYNECOLOGY
Payer: MEDICAID

## 2024-11-27 VITALS
DIASTOLIC BLOOD PRESSURE: 82 MMHG | WEIGHT: 197.63 LBS | SYSTOLIC BLOOD PRESSURE: 132 MMHG | HEART RATE: 90 BPM | BODY MASS INDEX: 31.89 KG/M2

## 2024-11-27 DIAGNOSIS — Z3A.30 30 WEEKS GESTATION OF PREGNANCY: ICD-10-CM

## 2024-11-27 DIAGNOSIS — O09.893 SUPERVISION OF OTHER HIGH RISK PREGNANCIES, THIRD TRIMESTER: Primary | ICD-10-CM

## 2024-11-27 DIAGNOSIS — O10.913 CHRONIC HYPERTENSION COMPLICATING OR REASON FOR CARE DURING PREGNANCY, THIRD TRIMESTER: ICD-10-CM

## 2024-11-27 LAB
ALBUMIN SERPL BCP-MCNC: 2.9 G/DL (ref 3.5–5.2)
ALP SERPL-CCNC: 119 U/L (ref 40–150)
ALT SERPL W/O P-5'-P-CCNC: 9 U/L (ref 10–44)
ANION GAP SERPL CALC-SCNC: 8 MMOL/L (ref 8–16)
AST SERPL-CCNC: 10 U/L (ref 10–40)
BASOPHILS # BLD AUTO: 0.03 K/UL (ref 0–0.2)
BASOPHILS NFR BLD: 0.3 % (ref 0–1.9)
BILIRUB SERPL-MCNC: 0.3 MG/DL (ref 0.1–1)
BUN SERPL-MCNC: 4 MG/DL (ref 6–20)
CALCIUM SERPL-MCNC: 8.9 MG/DL (ref 8.7–10.5)
CHLORIDE SERPL-SCNC: 107 MMOL/L (ref 95–110)
CO2 SERPL-SCNC: 22 MMOL/L (ref 23–29)
CREAT SERPL-MCNC: 0.5 MG/DL (ref 0.5–1.4)
CREAT UR-MCNC: 30.1 MG/DL (ref 15–325)
DIFFERENTIAL METHOD BLD: ABNORMAL
EOSINOPHIL # BLD AUTO: 0.2 K/UL (ref 0–0.5)
EOSINOPHIL NFR BLD: 2.3 % (ref 0–8)
ERYTHROCYTE [DISTWIDTH] IN BLOOD BY AUTOMATED COUNT: 13.2 % (ref 11.5–14.5)
EST. GFR  (NO RACE VARIABLE): >60 ML/MIN/1.73 M^2
GLUCOSE SERPL-MCNC: 88 MG/DL (ref 70–110)
HCT VFR BLD AUTO: 31 % (ref 37–48.5)
HGB BLD-MCNC: 10.5 G/DL (ref 12–16)
IMM GRANULOCYTES # BLD AUTO: 0.06 K/UL (ref 0–0.04)
IMM GRANULOCYTES NFR BLD AUTO: 0.6 % (ref 0–0.5)
LYMPHOCYTES # BLD AUTO: 2.1 K/UL (ref 1–4.8)
LYMPHOCYTES NFR BLD: 19.8 % (ref 18–48)
MCH RBC QN AUTO: 27.6 PG (ref 27–31)
MCHC RBC AUTO-ENTMCNC: 33.9 G/DL (ref 32–36)
MCV RBC AUTO: 82 FL (ref 82–98)
MONOCYTES # BLD AUTO: 0.6 K/UL (ref 0.3–1)
MONOCYTES NFR BLD: 5.9 % (ref 4–15)
NEUTROPHILS # BLD AUTO: 7.5 K/UL (ref 1.8–7.7)
NEUTROPHILS NFR BLD: 71.1 % (ref 38–73)
NRBC BLD-RTO: 0 /100 WBC
PLATELET # BLD AUTO: 211 K/UL (ref 150–450)
PMV BLD AUTO: 9.5 FL (ref 9.2–12.9)
POTASSIUM SERPL-SCNC: 3.5 MMOL/L (ref 3.5–5.1)
PROT SERPL-MCNC: 6.2 G/DL (ref 6–8.4)
PROT UR-MCNC: <7 MG/DL (ref 0–15)
PROT/CREAT UR: NORMAL MG/G{CREAT} (ref 0–0.2)
RBC # BLD AUTO: 3.8 M/UL (ref 4–5.4)
SODIUM SERPL-SCNC: 137 MMOL/L (ref 136–145)
WBC # BLD AUTO: 10.51 K/UL (ref 3.9–12.7)

## 2024-11-27 PROCEDURE — 84156 ASSAY OF PROTEIN URINE: CPT | Performed by: OBSTETRICS & GYNECOLOGY

## 2024-11-27 PROCEDURE — 99213 OFFICE O/P EST LOW 20 MIN: CPT | Mod: TH,S$PBB,, | Performed by: OBSTETRICS & GYNECOLOGY

## 2024-11-27 PROCEDURE — 80053 COMPREHEN METABOLIC PANEL: CPT | Performed by: OBSTETRICS & GYNECOLOGY

## 2024-11-27 PROCEDURE — 36415 COLL VENOUS BLD VENIPUNCTURE: CPT | Performed by: OBSTETRICS & GYNECOLOGY

## 2024-11-27 PROCEDURE — 99999 PR PBB SHADOW E&M-EST. PATIENT-LVL II: CPT | Mod: PBBFAC,,, | Performed by: OBSTETRICS & GYNECOLOGY

## 2024-11-27 PROCEDURE — 99212 OFFICE O/P EST SF 10 MIN: CPT | Mod: PBBFAC,TH | Performed by: OBSTETRICS & GYNECOLOGY

## 2024-11-27 PROCEDURE — 85025 COMPLETE CBC W/AUTO DIFF WBC: CPT | Performed by: OBSTETRICS & GYNECOLOGY

## 2024-11-27 NOTE — PROGRESS NOTES
Patient with occ headaches and reports BP can be elevated in the evenings.  Normotensive now with normal DTR and negative protein in urine.  Will obtain pre-e labs.     Good FM. Growth scan ordered for next visit.     Vitals signs, FHTs, urine dip, and PE findings documented, reviewed and available in OB flow chart.       I spent a total of 20 minutes on the day of the visit.This includes face to face time and non-face to face time preparing to see the patient (eg, review of tests), Obtaining and/or reviewing separately obtained history, Documenting clinical information in the electronic or other health record, Independently interpreting resultsand communicating results to the patient/family/caregiver, or Care coordination.     Coffective Motivation Document discussed with mother. Reinforced benefits of breastfeeding, risk of formula, and cue based feedings. Encouraged mother to download [employer] mobile terese if she has not already done so. Mother verbalizes understanding.

## 2024-12-07 ENCOUNTER — PATIENT MESSAGE (OUTPATIENT)
Dept: OTHER | Facility: OTHER | Age: 19
End: 2024-12-07
Payer: MEDICAID

## 2024-12-10 ENCOUNTER — ROUTINE PRENATAL (OUTPATIENT)
Dept: OBSTETRICS AND GYNECOLOGY | Facility: CLINIC | Age: 19
End: 2024-12-10
Payer: MEDICAID

## 2024-12-10 ENCOUNTER — PROCEDURE VISIT (OUTPATIENT)
Dept: MATERNAL FETAL MEDICINE | Facility: CLINIC | Age: 19
End: 2024-12-10
Payer: MEDICAID

## 2024-12-10 VITALS
WEIGHT: 198.38 LBS | BODY MASS INDEX: 32.02 KG/M2 | HEART RATE: 97 BPM | SYSTOLIC BLOOD PRESSURE: 136 MMHG | DIASTOLIC BLOOD PRESSURE: 76 MMHG

## 2024-12-10 DIAGNOSIS — Z36.2 ENCOUNTER FOR FOLLOW-UP ULTRASOUND OF FETAL ANATOMY: ICD-10-CM

## 2024-12-10 DIAGNOSIS — O09.893 SUPERVISION OF OTHER HIGH RISK PREGNANCIES, THIRD TRIMESTER: Primary | ICD-10-CM

## 2024-12-10 DIAGNOSIS — Z3A.32 32 WEEKS GESTATION OF PREGNANCY: ICD-10-CM

## 2024-12-10 DIAGNOSIS — O10.913 CHRONIC HYPERTENSION COMPLICATING OR REASON FOR CARE DURING PREGNANCY, THIRD TRIMESTER: ICD-10-CM

## 2024-12-10 DIAGNOSIS — O99.210 OBESITY AFFECTING PREGNANCY, ANTEPARTUM, UNSPECIFIED OBESITY TYPE: ICD-10-CM

## 2024-12-10 PROCEDURE — 99213 OFFICE O/P EST LOW 20 MIN: CPT | Mod: TH,S$PBB,, | Performed by: OBSTETRICS & GYNECOLOGY

## 2024-12-10 PROCEDURE — 76819 FETAL BIOPHYS PROFIL W/O NST: CPT | Mod: 26,S$PBB,, | Performed by: OBSTETRICS & GYNECOLOGY

## 2024-12-10 PROCEDURE — 76816 OB US FOLLOW-UP PER FETUS: CPT | Mod: PBBFAC | Performed by: OBSTETRICS & GYNECOLOGY

## 2024-12-10 PROCEDURE — 99212 OFFICE O/P EST SF 10 MIN: CPT | Mod: PBBFAC,TH | Performed by: OBSTETRICS & GYNECOLOGY

## 2024-12-10 PROCEDURE — 99999 PR PBB SHADOW E&M-EST. PATIENT-LVL II: CPT | Mod: PBBFAC,,, | Performed by: OBSTETRICS & GYNECOLOGY

## 2024-12-10 NOTE — PROGRESS NOTES
Patient with no complaints. Denies vaginal bleeding or contractions. Good FM. Growth scan reviewed.   Placenta no longer low lying.    Mild polyhydramnios.    CHTN:  Procardia 30XL  BPP   Continue weekly  testing.    RTC in 2 weeks.     Vitals signs, FHTs, urine dip, and PE findings documented, reviewed and available in OB flow chart.       I spent a total of 20 minutes on the day of the visit.This includes face to face time and non-face to face time preparing to see the patient (eg, review of tests), Obtaining and/or reviewing separately obtained history, Documenting clinical information in the electronic or other health record, Independently interpreting resultsand communicating results to the patient/family/caregiver, or Care coordination.     Coffective counseling sheet Build Your Team discussed with mother. Reinforced importance of early identification of support team including champion, OB provider, pediatrician and local community resources. Encouraged mother to download Coffective mobile terese if she has not already done so.  Mother verbalizes understanding. Also discussed quiet time and delayed bathing.

## 2024-12-17 ENCOUNTER — CLINICAL SUPPORT (OUTPATIENT)
Dept: OBSTETRICS AND GYNECOLOGY | Facility: CLINIC | Age: 19
End: 2024-12-17
Payer: MEDICAID

## 2024-12-17 DIAGNOSIS — O10.913 CHRONIC HYPERTENSION COMPLICATING OR REASON FOR CARE DURING PREGNANCY, THIRD TRIMESTER: ICD-10-CM

## 2024-12-17 DIAGNOSIS — O99.210 OBESITY AFFECTING PREGNANCY, ANTEPARTUM, UNSPECIFIED OBESITY TYPE: ICD-10-CM

## 2024-12-17 DIAGNOSIS — O09.893 SUPERVISION OF OTHER HIGH RISK PREGNANCIES, THIRD TRIMESTER: Primary | ICD-10-CM

## 2024-12-17 PROCEDURE — 59025 FETAL NON-STRESS TEST: CPT | Mod: PBBFAC | Performed by: OBSTETRICS & GYNECOLOGY

## 2024-12-17 PROCEDURE — 59025 FETAL NON-STRESS TEST: CPT | Mod: 26,S$PBB,, | Performed by: OBSTETRICS & GYNECOLOGY

## 2024-12-17 NOTE — PROCEDURES
Procedures   FETAL ASSESSMENT REPORT    RE: Brenda Perez  MRN:  [id number]  :  2005  AGE:  19 y.o.    Date:  2024    REFERRAL PHYSICIAN: Dr. Marley Bridges    Allergies: Patient has no known allergies.    Brenda is a 19 y.o.  at 33w3d gestational age here today for a NST.    2025, Date entered prior to episode creation    MEDICATIONS AT TIME OF TEST:    Current Outpatient Medications   Medication Sig Dispense Refill    aspirin 81 MG Chew Take 1 tablet (81 mg total) by mouth once daily. 30 tablet 11    NIFEdipine (PROCARDIA-XL) 60 MG (OSM) 24 hr tablet       ondansetron (ZOFRAN-ODT) 4 MG TbDL Take 4 mg by mouth. (Patient not taking: Reported on 2024)      prenatal vit/iron fum/folic ac (PRENATAL 1+1 ORAL) Take by mouth.       No current facility-administered medications for this visit.       Indication: chronic hypertension    Interpretation:  140 BPM baseline    Variability:  Good {> 6 bpm)    Accelerations:  Reactive    Decelerations:  none    [place]: contractions absent    Assessment: reactive    Plan:  NST reactive      Marley Bridges MD  2024; 5:17 PM

## 2024-12-20 ENCOUNTER — E-VISIT (OUTPATIENT)
Dept: OBSTETRICS AND GYNECOLOGY | Facility: CLINIC | Age: 19
End: 2024-12-20
Payer: MEDICAID

## 2024-12-20 ENCOUNTER — HOSPITAL ENCOUNTER (EMERGENCY)
Facility: HOSPITAL | Age: 19
Discharge: HOME | End: 2024-12-20
Attending: STUDENT IN AN ORGANIZED HEALTH CARE EDUCATION/TRAINING PROGRAM
Payer: MEDICAID

## 2024-12-20 VITALS
TEMPERATURE: 99 F | OXYGEN SATURATION: 100 % | RESPIRATION RATE: 19 BRPM | HEART RATE: 110 BPM | DIASTOLIC BLOOD PRESSURE: 78 MMHG | BODY MASS INDEX: 31.74 KG/M2 | SYSTOLIC BLOOD PRESSURE: 139 MMHG | WEIGHT: 196.63 LBS

## 2024-12-20 DIAGNOSIS — R07.9 CHEST PAIN, UNSPECIFIED TYPE: Primary | ICD-10-CM

## 2024-12-20 DIAGNOSIS — R06.02 SOB (SHORTNESS OF BREATH): ICD-10-CM

## 2024-12-20 LAB
ALBUMIN SERPL BCP-MCNC: 3 G/DL (ref 3.5–5.2)
ALP SERPL-CCNC: 157 U/L (ref 40–150)
ALT SERPL W/O P-5'-P-CCNC: 14 U/L (ref 10–44)
ANION GAP SERPL CALC-SCNC: 10 MMOL/L (ref 8–16)
AST SERPL-CCNC: 16 U/L (ref 10–40)
BACTERIA #/AREA URNS HPF: NORMAL /HPF
BASOPHILS # BLD AUTO: 0.02 K/UL (ref 0–0.2)
BASOPHILS NFR BLD: 0.2 % (ref 0–1.9)
BILIRUB SERPL-MCNC: 0.3 MG/DL (ref 0.1–1)
BILIRUB UR QL STRIP: NEGATIVE
BNP SERPL-MCNC: <10 PG/ML (ref 0–99)
BUN SERPL-MCNC: 3 MG/DL (ref 6–20)
CALCIUM SERPL-MCNC: 8.9 MG/DL (ref 8.7–10.5)
CHLORIDE SERPL-SCNC: 107 MMOL/L (ref 95–110)
CLARITY UR: CLEAR
CO2 SERPL-SCNC: 21 MMOL/L (ref 23–29)
COLOR UR: YELLOW
CREAT SERPL-MCNC: 0.5 MG/DL (ref 0.5–1.4)
CREAT UR-MCNC: 46.8 MG/DL (ref 15–325)
D DIMER PPP IA.FEU-MCNC: 1.28 MG/L FEU
DIFFERENTIAL METHOD BLD: ABNORMAL
EOSINOPHIL # BLD AUTO: 0.1 K/UL (ref 0–0.5)
EOSINOPHIL NFR BLD: 1.7 % (ref 0–8)
ERYTHROCYTE [DISTWIDTH] IN BLOOD BY AUTOMATED COUNT: 13.5 % (ref 11.5–14.5)
EST. GFR  (NO RACE VARIABLE): >60 ML/MIN/1.73 M^2
GLUCOSE SERPL-MCNC: 82 MG/DL (ref 70–110)
GLUCOSE UR QL STRIP: NEGATIVE
HCT VFR BLD AUTO: 31.8 % (ref 37–48.5)
HGB BLD-MCNC: 10.5 G/DL (ref 12–16)
HGB UR QL STRIP: NEGATIVE
HYALINE CASTS #/AREA URNS LPF: 1 /LPF
IMM GRANULOCYTES # BLD AUTO: 0.04 K/UL (ref 0–0.04)
IMM GRANULOCYTES NFR BLD AUTO: 0.5 % (ref 0–0.5)
INFLUENZA A, MOLECULAR: NEGATIVE
INFLUENZA B, MOLECULAR: NEGATIVE
KETONES UR QL STRIP: NEGATIVE
LDH SERPL L TO P-CCNC: 138 U/L (ref 110–260)
LEUKOCYTE ESTERASE UR QL STRIP: ABNORMAL
LYMPHOCYTES # BLD AUTO: 2.1 K/UL (ref 1–4.8)
LYMPHOCYTES NFR BLD: 25.8 % (ref 18–48)
MAGNESIUM SERPL-MCNC: 1.9 MG/DL (ref 1.6–2.6)
MCH RBC QN AUTO: 26.7 PG (ref 27–31)
MCHC RBC AUTO-ENTMCNC: 33 G/DL (ref 32–36)
MCV RBC AUTO: 81 FL (ref 82–98)
MICROSCOPIC COMMENT: NORMAL
MONOCYTES # BLD AUTO: 0.6 K/UL (ref 0.3–1)
MONOCYTES NFR BLD: 7.4 % (ref 4–15)
NEUTROPHILS # BLD AUTO: 5.2 K/UL (ref 1.8–7.7)
NEUTROPHILS NFR BLD: 64.4 % (ref 38–73)
NITRITE UR QL STRIP: NEGATIVE
NRBC BLD-RTO: 0 /100 WBC
PH UR STRIP: 7 [PH] (ref 5–8)
PHOSPHATE SERPL-MCNC: 4 MG/DL (ref 2.7–4.5)
PLATELET # BLD AUTO: 196 K/UL (ref 150–450)
PMV BLD AUTO: 10.2 FL (ref 9.2–12.9)
POTASSIUM SERPL-SCNC: 4 MMOL/L (ref 3.5–5.1)
PROT SERPL-MCNC: 6.6 G/DL (ref 6–8.4)
PROT UR QL STRIP: NEGATIVE
PROT UR-MCNC: 10 MG/DL (ref 0–15)
PROT/CREAT UR: 0.21 MG/G{CREAT} (ref 0–0.2)
RBC # BLD AUTO: 3.93 M/UL (ref 4–5.4)
SARS-COV-2 RDRP RESP QL NAA+PROBE: NEGATIVE
SODIUM SERPL-SCNC: 138 MMOL/L (ref 136–145)
SP GR UR STRIP: 1.01 (ref 1–1.03)
SPECIMEN SOURCE: NORMAL
SQUAMOUS #/AREA URNS HPF: 1 /HPF
TROPONIN I SERPL DL<=0.01 NG/ML-MCNC: <0.006 NG/ML (ref 0–0.03)
URN SPEC COLLECT METH UR: ABNORMAL
UROBILINOGEN UR STRIP-ACNC: 1 EU/DL
WBC # BLD AUTO: 8.11 K/UL (ref 3.9–12.7)
WBC #/AREA URNS HPF: 2 /HPF (ref 0–5)

## 2024-12-20 PROCEDURE — 99284 EMERGENCY DEPT VISIT MOD MDM: CPT | Mod: 25

## 2024-12-20 PROCEDURE — 84100 ASSAY OF PHOSPHORUS: CPT | Performed by: NURSE PRACTITIONER

## 2024-12-20 PROCEDURE — 83615 LACTATE (LD) (LDH) ENZYME: CPT | Performed by: NURSE PRACTITIONER

## 2024-12-20 PROCEDURE — 93010 ELECTROCARDIOGRAM REPORT: CPT | Mod: ,,, | Performed by: INTERNAL MEDICINE

## 2024-12-20 PROCEDURE — 84484 ASSAY OF TROPONIN QUANT: CPT | Performed by: NURSE PRACTITIONER

## 2024-12-20 PROCEDURE — 87635 SARS-COV-2 COVID-19 AMP PRB: CPT | Performed by: NURSE PRACTITIONER

## 2024-12-20 PROCEDURE — 85379 FIBRIN DEGRADATION QUANT: CPT | Performed by: NURSE PRACTITIONER

## 2024-12-20 PROCEDURE — 81000 URINALYSIS NONAUTO W/SCOPE: CPT | Performed by: STUDENT IN AN ORGANIZED HEALTH CARE EDUCATION/TRAINING PROGRAM

## 2024-12-20 PROCEDURE — 87502 INFLUENZA DNA AMP PROBE: CPT | Performed by: NURSE PRACTITIONER

## 2024-12-20 PROCEDURE — 85025 COMPLETE CBC W/AUTO DIFF WBC: CPT | Performed by: NURSE PRACTITIONER

## 2024-12-20 PROCEDURE — 83880 ASSAY OF NATRIURETIC PEPTIDE: CPT | Performed by: NURSE PRACTITIONER

## 2024-12-20 PROCEDURE — 83735 ASSAY OF MAGNESIUM: CPT | Performed by: NURSE PRACTITIONER

## 2024-12-20 PROCEDURE — 93005 ELECTROCARDIOGRAM TRACING: CPT

## 2024-12-20 PROCEDURE — 84156 ASSAY OF PROTEIN URINE: CPT | Performed by: NURSE PRACTITIONER

## 2024-12-20 PROCEDURE — 80053 COMPREHEN METABOLIC PANEL: CPT | Performed by: NURSE PRACTITIONER

## 2024-12-20 NOTE — ED PROVIDER NOTES
Encounter Date: 2024       History     Chief Complaint   Patient presents with    Shortness of Breath     PT ARRIVES TO THE ED FOR HIGH PULSE AND BLOOD PRESSURE AND SOB AFTER BEING SEEN WITH DR. CATES FOR E-VIST.       19-year-old female  at 33 weeks presenting with three days of shortness a breath and a feeling of high pulse rate over the last 24 hours.  Patient reports mild anterior chest pain, nonpleuritic.  No fever, cough, congestion.  No abdominal pain or vaginal bleeding.  No nausea, vomiting, diarrhea.  No dysuria or back pain.  Patient reports that she has suffered from preeclampsia and high blood pressure during prior pregnancies.  Denies any leg swelling.  No other complaints.      Review of patient's allergies indicates:  No Known Allergies  Past Medical History:   Diagnosis Date    COVID-2021    Hypertension     WITH PREGNANCY- MONITORING    Pregnancy      (vaginal delivery)    Seizures     childhood, grew out of them, per pt     Past Surgical History:   Procedure Laterality Date    DIAGNOSTIC LAPAROSCOPY N/A 10/26/2023    Procedure: LAPAROSCOPY, DIAGNOSTIC;  Surgeon: Rui Miller Jr., MD;  Location: [facility] OR;  Service: OB/GYN;  Laterality: N/A;  To follow 1st case per Dr. Grant    FULGURATION OF ENDOMETRIOSIS  10/26/2023    Procedure: FULGURATION, ENDOMETRIOSIS;  Surgeon: Rui Miller Jr., MD;  Location: [facility] OR;  Service: OB/GYN;;     Family History   Problem Relation Name Age of Onset    Colon cancer Maternal Grandfather      No Known Problems Father      No Known Problems Mother      No Known Problems Sister      Breast cancer Neg Hx      Ovarian cancer Neg Hx       Social History     Tobacco Use    Smoking status: Never    Smokeless tobacco: Never   Substance Use Topics    Alcohol use: Yes     Comment: occasional- not during pregnancy    Drug use: Never     Review of Systems   Constitutional:  Negative for fever.   HENT:  Negative for congestion and sore throat.     Respiratory:  Positive for shortness of breath. Negative for cough.    Cardiovascular:  Positive for chest pain.   Gastrointestinal:  Negative for nausea.   Genitourinary:  Negative for dysuria.   Musculoskeletal:  Negative for back pain.   Skin:  Negative for rash.   Neurological:  Negative for weakness.   Hematological:  Does not bruise/bleed easily.       Physical Exam     Initial Vitals [12/20/24 1547]   BP Pulse Resp Temp SpO2   (!) 154/84 101 20 98.9 °F (37.2 °C) 100 %      MAP       --         Physical Exam    Nursing note and vitals reviewed.  Constitutional: She appears well-developed.   HENT:   Head: Normocephalic.   Eyes: Pupils are equal, round, and reactive to light.   Neck:   Normal range of motion.  Cardiovascular:            No murmur heard.  Pulmonary/Chest: No respiratory distress.   Clear lungs bilaterally.  No respiratory distress.  No wheezing or rales.  Good air movement.     Abdominal: Abdomen is soft.   Musculoskeletal:         General: No edema.      Cervical back: Normal range of motion.     Neurological: She is alert.   Skin: Skin is warm.   Psychiatric: She has a normal mood and affect.         ED Course   Procedures  Labs Reviewed   CBC W/ AUTO DIFFERENTIAL - Abnormal       Result Value    WBC 8.11      RBC 3.93 (*)     Hemoglobin 10.5 (*)     Hematocrit 31.8 (*)     MCV 81 (*)     MCH 26.7 (*)     MCHC 33.0      RDW 13.5      Platelets 196      MPV 10.2      Immature Granulocytes 0.5      Gran # (ANC) 5.2      Immature Grans (Abs) 0.04      Lymph # 2.1      Mono # 0.6      Eos # 0.1      Baso # 0.02      nRBC 0      Gran % 64.4      Lymph % 25.8      Mono % 7.4      Eosinophil % 1.7      Basophil % 0.2      Differential Method Automated     COMPREHENSIVE METABOLIC PANEL - Abnormal    Sodium 138      Potassium 4.0      Chloride 107      CO2 21 (*)     Glucose 82      BUN 3 (*)     Creatinine 0.5      Calcium 8.9      Total Protein 6.6      Albumin 3.0 (*)     Total Bilirubin 0.3       Alkaline Phosphatase 157 (*)     AST 16      ALT 14      eGFR >60      Anion Gap 10     D DIMER, QUANTITATIVE - Abnormal    D-Dimer 1.28 (*)    INFLUENZA A & B BY MOLECULAR    Influenza A, Molecular Negative      Influenza B, Molecular Negative      Flu A & B Source Nasal swab     LACTATE DEHYDROGENASE         MAGNESIUM    Magnesium 1.9     PHOSPHORUS    Phosphorus 4.0     TROPONIN I    Troponin I <0.006     B-TYPE NATRIURETIC PEPTIDE    BNP <10     SARS-COV-2 RNA AMPLIFICATION, QUAL    SARS-CoV-2 RNA, Amplification, Qual Negative     PROTEIN / CREATININE RATIO, URINE     EKG Readings: (Independently Interpreted)   Initial Reading: No STEMI. Rhythm: Normal Sinus Rhythm. Heart Rate: 98. Ectopy: No Ectopy. Conduction: Normal.       Imaging Results    None          Medications - No data to display  Medical Decision Making  DDX:  Shortness of breath, tachycardia, chest pain.  Assess for ACS, arrhythmia, heart failure, myocarditis, pulmonary embolism, viral causes.  DX:  CBC, CMP, LDH, protein creatinine ratio, UA, U preg, BNP, D-dimer, troponin, EKG  TX:  Pending workup  Dispo:  Pending workup        Amount and/or Complexity of Data Reviewed  Labs:  Decision-making details documented in ED Course.               ED Course as of 12/20/24 1742   Fri Dec 20, 2024   1739 D-Dimer(!): 1.28  Patient's elevated D-dimer which is expected with pregnancy.  Patient heart rate is 106, however spoke with Dr. Sheridan who states that this has normal for pregnancy.  Given the fact that she is not hypoxic, her shortness of breath is intermittent, Dr. Sheridan feels that patient does not require a CTA at this time.  I agree, and I have a low suspicion for pulmonary embolism at this time given patient is comfortable appearance, her lack of significant tachycardia, and her lack of hypoxia.  Will discharge home with strict instructions to return if any of the symptoms worsen. [NB]      ED Course User Index  [NB] Cullen Mckinney MD                            Clinical Impression:  Final diagnoses:  [R06.02] SOB (shortness of breath)                 Cullen Mckinney MD  12/20/24 1727       Cullen Mckinney MD  12/20/24 1741

## 2024-12-20 NOTE — DISCHARGE INSTRUCTIONS
Please follow up with your primary care physician within 2 days. Ensure that you review all lab work results and/or imaging results. If you have any questions about your discharge paperwork please call the Emergency Department.     Return to the ED for any chest pain, upper abdominal pain, shortness of breath, lightheadedness, or any new or worsening symptoms.     If you were prescribed antibiotics, please take them to completion. If you were prescribed a narcotic or any sedating medication, do not drive or operate heavy equipment or machinery while taking these medications.  If you were diagnosed with a seizure, syncope, any loss of consciousness or decreased alertness, do not drive, swim, operate heavy machinery, or put yourself in any position where a sudden loss of consciousness could put yourself or others in danger.    Thank you for visiting [facility], Department of Emergency Medicine. Please see the entirety of the educational materials provided. Please note that a visit to the emergency department does not substitute ongoing care from a primary medical provider or specialist. Please ensure to follow up as recommended. However, please return to the emergency department immediately if symptoms do not improve as discussed, symptoms worsen, new symptoms develop, difficulty in following up or for any of your concerns or issues. Please note on discharge you are acknowledging understanding and agreement on medical evaluation, management recommendations and follow up recommendations.

## 2024-12-20 NOTE — PROGRESS NOTES
Pt was called and instructed to report to an ER for an evaluation based on her symptoms per Dr. Bridges. Pt voiced understanding.

## 2024-12-24 ENCOUNTER — PROCEDURE VISIT (OUTPATIENT)
Dept: MATERNAL FETAL MEDICINE | Facility: CLINIC | Age: 19
End: 2024-12-24
Payer: MEDICAID

## 2024-12-24 ENCOUNTER — ROUTINE PRENATAL (OUTPATIENT)
Dept: OBSTETRICS AND GYNECOLOGY | Facility: CLINIC | Age: 19
End: 2024-12-24
Payer: MEDICAID

## 2024-12-24 VITALS
SYSTOLIC BLOOD PRESSURE: 128 MMHG | HEART RATE: 84 BPM | DIASTOLIC BLOOD PRESSURE: 92 MMHG | BODY MASS INDEX: 31.74 KG/M2 | WEIGHT: 196.63 LBS

## 2024-12-24 DIAGNOSIS — O99.210 OBESITY AFFECTING PREGNANCY, ANTEPARTUM, UNSPECIFIED OBESITY TYPE: ICD-10-CM

## 2024-12-24 DIAGNOSIS — O40.9XX0 POLYHYDRAMNIOS AFFECTING PREGNANCY: ICD-10-CM

## 2024-12-24 DIAGNOSIS — O10.913 CHRONIC HYPERTENSION COMPLICATING OR REASON FOR CARE DURING PREGNANCY, THIRD TRIMESTER: Primary | ICD-10-CM

## 2024-12-24 DIAGNOSIS — O09.893 SUPERVISION OF OTHER HIGH RISK PREGNANCIES, THIRD TRIMESTER: ICD-10-CM

## 2024-12-24 DIAGNOSIS — O10.913 CHRONIC HYPERTENSION COMPLICATING OR REASON FOR CARE DURING PREGNANCY, THIRD TRIMESTER: ICD-10-CM

## 2024-12-24 LAB
CREAT UR-MCNC: 90.3 MG/DL (ref 15–325)
PROT UR-MCNC: 13 MG/DL (ref 0–15)
PROT/CREAT UR: 0.14 MG/G{CREAT} (ref 0–0.2)

## 2024-12-24 PROCEDURE — 76819 FETAL BIOPHYS PROFIL W/O NST: CPT | Mod: PBBFAC | Performed by: OBSTETRICS & GYNECOLOGY

## 2024-12-24 PROCEDURE — 99213 OFFICE O/P EST LOW 20 MIN: CPT | Mod: 25,S$PBB,TH, | Performed by: STUDENT IN AN ORGANIZED HEALTH CARE EDUCATION/TRAINING PROGRAM

## 2024-12-24 PROCEDURE — 82570 ASSAY OF URINE CREATININE: CPT | Performed by: STUDENT IN AN ORGANIZED HEALTH CARE EDUCATION/TRAINING PROGRAM

## 2024-12-24 PROCEDURE — 99999 PR PBB SHADOW E&M-EST. PATIENT-LVL II: CPT | Mod: PBBFAC,,, | Performed by: STUDENT IN AN ORGANIZED HEALTH CARE EDUCATION/TRAINING PROGRAM

## 2024-12-24 PROCEDURE — 99212 OFFICE O/P EST SF 10 MIN: CPT | Mod: PBBFAC,TH | Performed by: STUDENT IN AN ORGANIZED HEALTH CARE EDUCATION/TRAINING PROGRAM

## 2024-12-24 NOTE — PROGRESS NOTES
Patient with increased headaches and visual disturbances. She repots that it is happening mainly at night. She is also having some shortness of breath with exertion. Denies vaginal bleeding or contractions. Good FM. RTC in 2 weeks.     Polyhydramnios stable from previous exam.     Vitals signs, FHTs, urine dip, and PE findings documented, reviewed and available in OB flow chart.    I spent a total of 20 minutes on the day of the visit. This includes face to face time and non-face to face time preparing to see the patient (eg, review of tests), Obtaining and/or reviewing separately obtained history, Documenting clinical information in the electronic or other health record, Independently interpreting resultsand communicating results to the patient/family/caregiver, or Care coordination.

## 2024-12-27 DIAGNOSIS — O10.913 CHRONIC HYPERTENSION COMPLICATING OR REASON FOR CARE DURING PREGNANCY, THIRD TRIMESTER: Primary | ICD-10-CM

## 2024-12-27 DIAGNOSIS — O40.9XX0 POLYHYDRAMNIOS AFFECTING PREGNANCY: ICD-10-CM

## 2024-12-27 LAB
OHS QRS DURATION: 80 MS
OHS QTC CALCULATION: 418 MS

## 2024-12-30 ENCOUNTER — LAB VISIT (OUTPATIENT)
Dept: LAB | Facility: HOSPITAL | Age: 19
End: 2024-12-30
Attending: STUDENT IN AN ORGANIZED HEALTH CARE EDUCATION/TRAINING PROGRAM
Payer: MEDICAID

## 2024-12-30 ENCOUNTER — CLINICAL SUPPORT (OUTPATIENT)
Dept: OBSTETRICS AND GYNECOLOGY | Facility: CLINIC | Age: 19
End: 2024-12-30
Payer: MEDICAID

## 2024-12-30 ENCOUNTER — ROUTINE PRENATAL (OUTPATIENT)
Dept: OBSTETRICS AND GYNECOLOGY | Facility: CLINIC | Age: 19
End: 2024-12-30
Payer: MEDICAID

## 2024-12-30 VITALS
HEART RATE: 98 BPM | WEIGHT: 196 LBS | DIASTOLIC BLOOD PRESSURE: 82 MMHG | SYSTOLIC BLOOD PRESSURE: 134 MMHG | BODY MASS INDEX: 31.64 KG/M2

## 2024-12-30 DIAGNOSIS — Z3A.35 35 WEEKS GESTATION OF PREGNANCY: ICD-10-CM

## 2024-12-30 DIAGNOSIS — O10.913 CHRONIC HYPERTENSION COMPLICATING OR REASON FOR CARE DURING PREGNANCY, THIRD TRIMESTER: Primary | ICD-10-CM

## 2024-12-30 DIAGNOSIS — Z34.83 ENCOUNTER FOR SUPERVISION OF OTHER NORMAL PREGNANCY, THIRD TRIMESTER: Primary | ICD-10-CM

## 2024-12-30 DIAGNOSIS — O99.210 OBESITY AFFECTING PREGNANCY, ANTEPARTUM, UNSPECIFIED OBESITY TYPE: ICD-10-CM

## 2024-12-30 DIAGNOSIS — O10.913 CHRONIC HYPERTENSION COMPLICATING OR REASON FOR CARE DURING PREGNANCY, THIRD TRIMESTER: ICD-10-CM

## 2024-12-30 DIAGNOSIS — O40.9XX0 POLYHYDRAMNIOS AFFECTING PREGNANCY: ICD-10-CM

## 2024-12-30 DIAGNOSIS — O47.00 PREMATURE UTERINE CONTRACTIONS: ICD-10-CM

## 2024-12-30 LAB
ALBUMIN SERPL BCP-MCNC: 2.9 G/DL (ref 3.5–5.2)
ALP SERPL-CCNC: 155 U/L (ref 40–150)
ALT SERPL W/O P-5'-P-CCNC: 11 U/L (ref 10–44)
ANION GAP SERPL CALC-SCNC: 10 MMOL/L (ref 8–16)
AST SERPL-CCNC: 14 U/L (ref 10–40)
BASOPHILS # BLD AUTO: 0.02 K/UL (ref 0–0.2)
BASOPHILS NFR BLD: 0.2 % (ref 0–1.9)
BILIRUB SERPL-MCNC: 0.3 MG/DL (ref 0.1–1)
BUN SERPL-MCNC: 3 MG/DL (ref 6–20)
CALCIUM SERPL-MCNC: 8.7 MG/DL (ref 8.7–10.5)
CHLORIDE SERPL-SCNC: 107 MMOL/L (ref 95–110)
CO2 SERPL-SCNC: 21 MMOL/L (ref 23–29)
CREAT SERPL-MCNC: 0.5 MG/DL (ref 0.5–1.4)
CREAT UR-MCNC: 88.5 MG/DL (ref 15–325)
DIFFERENTIAL METHOD BLD: ABNORMAL
EOSINOPHIL # BLD AUTO: 0.1 K/UL (ref 0–0.5)
EOSINOPHIL NFR BLD: 1.3 % (ref 0–8)
ERYTHROCYTE [DISTWIDTH] IN BLOOD BY AUTOMATED COUNT: 13.8 % (ref 11.5–14.5)
EST. GFR  (NO RACE VARIABLE): >60 ML/MIN/1.73 M^2
GLUCOSE SERPL-MCNC: 103 MG/DL (ref 70–110)
HCT VFR BLD AUTO: 32 % (ref 37–48.5)
HGB BLD-MCNC: 10.6 G/DL (ref 12–16)
IMM GRANULOCYTES # BLD AUTO: 0.05 K/UL (ref 0–0.04)
IMM GRANULOCYTES NFR BLD AUTO: 0.6 % (ref 0–0.5)
LYMPHOCYTES # BLD AUTO: 1.5 K/UL (ref 1–4.8)
LYMPHOCYTES NFR BLD: 17.7 % (ref 18–48)
MCH RBC QN AUTO: 26.5 PG (ref 27–31)
MCHC RBC AUTO-ENTMCNC: 33.1 G/DL (ref 32–36)
MCV RBC AUTO: 80 FL (ref 82–98)
MONOCYTES # BLD AUTO: 0.5 K/UL (ref 0.3–1)
MONOCYTES NFR BLD: 6.1 % (ref 4–15)
NEUTROPHILS # BLD AUTO: 6.4 K/UL (ref 1.8–7.7)
NEUTROPHILS NFR BLD: 74.1 % (ref 38–73)
NRBC BLD-RTO: 0 /100 WBC
PLATELET # BLD AUTO: 203 K/UL (ref 150–450)
PMV BLD AUTO: 10.1 FL (ref 9.2–12.9)
POTASSIUM SERPL-SCNC: 3.5 MMOL/L (ref 3.5–5.1)
PROT SERPL-MCNC: 6.4 G/DL (ref 6–8.4)
PROT UR-MCNC: 14 MG/DL (ref 0–15)
PROT/CREAT UR: 0.16 MG/G{CREAT} (ref 0–0.2)
RBC # BLD AUTO: 4 M/UL (ref 4–5.4)
SODIUM SERPL-SCNC: 138 MMOL/L (ref 136–145)
WBC # BLD AUTO: 8.57 K/UL (ref 3.9–12.7)

## 2024-12-30 PROCEDURE — 99212 OFFICE O/P EST SF 10 MIN: CPT | Mod: PBBFAC,TH,25 | Performed by: OBSTETRICS & GYNECOLOGY

## 2024-12-30 PROCEDURE — 85025 COMPLETE CBC W/AUTO DIFF WBC: CPT | Performed by: STUDENT IN AN ORGANIZED HEALTH CARE EDUCATION/TRAINING PROGRAM

## 2024-12-30 PROCEDURE — 80053 COMPREHEN METABOLIC PANEL: CPT | Performed by: STUDENT IN AN ORGANIZED HEALTH CARE EDUCATION/TRAINING PROGRAM

## 2024-12-30 PROCEDURE — 82570 ASSAY OF URINE CREATININE: CPT | Performed by: STUDENT IN AN ORGANIZED HEALTH CARE EDUCATION/TRAINING PROGRAM

## 2024-12-30 PROCEDURE — 99999 PR PBB SHADOW E&M-EST. PATIENT-LVL II: CPT | Mod: PBBFAC,,, | Performed by: OBSTETRICS & GYNECOLOGY

## 2024-12-30 PROCEDURE — 59025 FETAL NON-STRESS TEST: CPT | Mod: PBBFAC | Performed by: OBSTETRICS & GYNECOLOGY

## 2024-12-30 PROCEDURE — 36415 COLL VENOUS BLD VENIPUNCTURE: CPT | Performed by: STUDENT IN AN ORGANIZED HEALTH CARE EDUCATION/TRAINING PROGRAM

## 2024-12-30 NOTE — PROCEDURES
Procedures   FETAL ASSESSMENT REPORT    RE: Brenda Perez  MRN:  [id number]  :  2005  AGE:  19 y.o.    Date:  2024    REFERRAL PHYSICIAN: Dr. Marley Bridges    Allergies: Patient has no known allergies.    Brenda is a 19 y.o.  at 35w2d gestational age here today for a NST.    2025, Date entered prior to episode creation    MEDICATIONS AT TIME OF TEST:    Current Outpatient Medications   Medication Sig Dispense Refill    aspirin 81 MG Chew Take 1 tablet (81 mg total) by mouth once daily. 30 tablet 11    NIFEdipine (PROCARDIA-XL) 60 MG (OSM) 24 hr tablet       ondansetron (ZOFRAN-ODT) 4 MG TbDL Take 4 mg by mouth. (Patient not taking: Reported on 2024)      prenatal vit/iron fum/folic ac (PRENATAL 1+1 ORAL) Take by mouth.       No current facility-administered medications for this visit.       Indication: chronic hypertension    Interpretation:  135 BPM baseline    Variability:  Good {> 6 bpm)    Accelerations:  Reactive    Decelerations:  none    [place]: contractions Q 10-15 minutes    Assessment: reactive    Plan:  NST reactive      Marley Bridges MD  2024; 5:40 PM

## 2024-12-30 NOTE — PROGRESS NOTES
Patient with c/o contractions for about 3 days. Denies vaginal bleeding or contractions. Good FM. RTC in 2 weeks.     CHTN  Procardia 60XL daily  ASA 81  NST today    Vitals signs, FHTs, urine dip, and PE findings documented, reviewed and available in OB flow chart.       I spent a total of 20 minutes on the day of the visit.This includes face to face time and non-face to face time preparing to see the patient (eg, review of tests), Obtaining and/or reviewing separately obtained history, Documenting clinical information in the electronic or other health record, Independently interpreting resultsand communicating results to the patient/family/caregiver, or Care coordination.

## 2024-12-31 ENCOUNTER — HOSPITAL ENCOUNTER (OUTPATIENT)
Facility: HOSPITAL | Age: 19
Discharge: HOME | End: 2025-01-01
Attending: OBSTETRICS & GYNECOLOGY | Admitting: OBSTETRICS & GYNECOLOGY
Payer: MEDICAID

## 2024-12-31 ENCOUNTER — NURSE TRIAGE (OUTPATIENT)
Dept: ADMINISTRATIVE | Facility: CLINIC | Age: 19
End: 2024-12-31
Payer: MEDICAID

## 2024-12-31 ENCOUNTER — HOSPITAL ENCOUNTER (OUTPATIENT)
Facility: HOSPITAL | Age: 19
Discharge: HOME | End: 2024-12-31
Attending: OBSTETRICS & GYNECOLOGY | Admitting: OBSTETRICS & GYNECOLOGY
Payer: MEDICAID

## 2024-12-31 ENCOUNTER — TELEPHONE (OUTPATIENT)
Dept: OBSTETRICS AND GYNECOLOGY | Facility: CLINIC | Age: 19
End: 2024-12-31
Payer: MEDICAID

## 2024-12-31 VITALS
DIASTOLIC BLOOD PRESSURE: 64 MMHG | TEMPERATURE: 97 F | HEIGHT: 66 IN | WEIGHT: 196 LBS | HEART RATE: 101 BPM | RESPIRATION RATE: 16 BRPM | OXYGEN SATURATION: 100 % | SYSTOLIC BLOOD PRESSURE: 106 MMHG | BODY MASS INDEX: 31.5 KG/M2

## 2024-12-31 DIAGNOSIS — O47.00 THREATENED PRETERM LABOR: ICD-10-CM

## 2024-12-31 PROCEDURE — 99211 OFF/OP EST MAY X REQ PHY/QHP: CPT

## 2024-12-31 PROCEDURE — 84112 EVAL AMNIOTIC FLUID PROTEIN: CPT | Performed by: OBSTETRICS & GYNECOLOGY

## 2024-12-31 PROCEDURE — 59025 FETAL NON-STRESS TEST: CPT

## 2024-12-31 PROCEDURE — 59025 FETAL NON-STRESS TEST: CPT | Mod: 26,,, | Performed by: OBSTETRICS & GYNECOLOGY

## 2024-12-31 RX ORDER — ACETAMINOPHEN 500 MG
500 TABLET ORAL EVERY 6 HOURS PRN
Status: DISCONTINUED | OUTPATIENT
Start: 2025-01-01 | End: 2025-01-01 | Stop reason: HOSPADM

## 2024-12-31 RX ORDER — SODIUM CHLORIDE, SODIUM LACTATE, POTASSIUM CHLORIDE, CALCIUM CHLORIDE 600; 310; 30; 20 MG/100ML; MG/100ML; MG/100ML; MG/100ML
INJECTION, SOLUTION INTRAVENOUS CONTINUOUS
Status: DISCONTINUED | OUTPATIENT
Start: 2025-01-01 | End: 2025-01-01 | Stop reason: HOSPADM

## 2024-12-31 RX ORDER — ONDANSETRON 8 MG/1
8 TABLET, ORALLY DISINTEGRATING ORAL EVERY 8 HOURS PRN
Status: DISCONTINUED | OUTPATIENT
Start: 2025-01-01 | End: 2025-01-01 | Stop reason: HOSPADM

## 2024-12-31 NOTE — NURSING
OB TRIAGE ASSESSMENT    RE: Brenda Perez  MRN:  [id number]  :  2005  AGE:  19 y.o.    Date:  2024    PHYSICIAN: Howard Reyes MD    Allergies: Patient has no known allergies.    Brenda is a 19 y.o.  at 35w3d gestational age presents with complaint(s) of contractions.      Triage Course:    Patient was evaluated in triage on L&D.  NST was NST reactive. Patient was monitored for 2.5 hours.  Cervix was 0.5/50/-4.  She received the following medications: none  The following labs were performed: none  Plan of care was discussed with MD on call.   Patient discharged home with return precautions and plans for routine follow up.      NST:    130 BPM baseline  Variability:  Good {> 6 bpm)  Accelerations:  Reactive  Decelerations:  none    [place]: contractions Q 3-5 minutes          Deepika Cline   2024; 12:40 PM

## 2024-12-31 NOTE — DISCHARGE INSTRUCTIONS
Return to the labor unit or call ob nurse at hospital if:    1.  Any questions whether the water bag broke or is leaking (sudden gush or suspected leak).   2.  If 35 wks or greater, no need to call about passing the mucous plug if no other signs of labor.  3.  You may have spotting for a day or two from the vaginal exam  4.  Please report heavier vaginal bleeding (requires you to put a pad on or blood is running down your leg)  5. Report if you are having more than 6 contractions in an hour and less than 37 weeks, but at 37-38 wks. and beyond you can time your contractions and notify doctor if they are longer, stronger and closer together  6.  You should call if you are experiencing sharp abdominal pains or severe cramping.    7.  If you are > or = 28 wks, Do fetal kick counts 2 times a day  &  report decreased fetal movement:  You should feel 10 movements in 2 hours or less.  Notify MD or OB nurse as soon as possible if you are not getting the required movements or if  it is taking you longer and longer to get the 10 movements and DO NOT WAIT UNTIL TOMORROW EVEN IF YOU HAVE AN APPT.  8.  Drink plenty of water and empty your bladder often, avoid caffeine & carbonated beverages as much as possible & avoid smoking  9.  Keep your appt. as scheduled    Office phone # [phone]  If after office hours, on the weekend, or unable to reach the nurse at the office, call the Hospital phone # [phone] & ask to speak to the OB nurse

## 2024-12-31 NOTE — TELEPHONE ENCOUNTER
Pt called clinic with complaints of samuel q 3-4 minutes lasting 1 min x 2 1/2 hours. Symptoms unrelieved with rest and increasing her hydration. Instructed ot to report to L&D to be evaluated. Pt voiced understanding. L&D notified.

## 2025-01-01 VITALS
HEART RATE: 96 BPM | SYSTOLIC BLOOD PRESSURE: 120 MMHG | DIASTOLIC BLOOD PRESSURE: 78 MMHG | RESPIRATION RATE: 18 BRPM | TEMPERATURE: 97 F | OXYGEN SATURATION: 100 % | HEIGHT: 66 IN | BODY MASS INDEX: 31.5 KG/M2 | WEIGHT: 196 LBS

## 2025-01-01 LAB — RUPTURE OF MEMBRANE: NEGATIVE

## 2025-01-01 NOTE — DISCHARGE INSTRUCTIONS
Return to the labor unit or call ob nurse at hospital if:    1.  Any questions whether the water bag broke or is leaking (sudden gush or suspected leak).   2.  If 35 wks or greater, no need to call about passing the mucous plug if no other signs of labor.  3.  You may have spotting for a day or two from the vaginal exam  4.  Please report heavier vaginal bleeding (requires you to put a pad on or blood is running down your leg)  5. Report if you are having more than 6 contractions in an hour and less than 37 weeks, but at 37-38 wks. and beyond you can time your contractions and notify doctor if they are longer, stronger and closer together  6.  You should call if you are experiencing sharp abdominal pains or severe cramping.    7.  If you are > or = 28 wks, Do fetal kick counts 2 times a day  &  report decreased fetal movement:  You should feel 10 movements in 2 hours or less.  Notify MD or OB nurse as soon as possible if you are not getting the required movements or if  it is taking you longer and longer to get the 10 movements and DO NOT WAIT UNTIL TOMORROW EVEN IF YOU HAVE AN APPT.  8.  Drink plenty of water and empty your bladder often, avoid caffeine & carbonated beverages as much as possible & avoid smoking  9.  Keep your appt. as scheduled    Office phone # [phone]  If after office hours, on the weekend, or unable to reach the nurse at the office, call the Hospital phone # [phone] & ask to speak to the OB nurse

## 2025-01-01 NOTE — TELEPHONE ENCOUNTER
"Patient states she has something leaking and its coming down her legs. She states it is not urine>  I have advised as per protocol.  Reason for Disposition   Leakage of fluid from vagina  (Exception: Patient is uncertain, but thinks it might be urine incontinence.)    Additional Information   Negative: [1] SEVERE abdominal pain (e.g., excruciating) AND [2] constant   Negative: SEVERE bleeding (e.g., continuous red blood from vagina, or large blood clots)   Negative: Umbilical cord hanging out of the vagina (shiny, white, curled appearance, "like telephone cord")   Negative: Uncontrollable urge to push (i.e., feels like baby is coming out now)   Negative: Can see baby   Negative: Sounds like a life-threatening emergency to the triager   Negative: < 20 weeks pregnant   Negative: Vaginal bleeding    Protocols used: Pregnancy - Rupture of Membranes [id number]    "

## 2025-01-02 NOTE — PROCEDURES
FETAL ASSESSMENT REPORT    RE: Brenda Perez  MRN:  [id number]  :  2005  AGE:  19 y.o.    Date:  2024    REFERRAL PHYSICIAN: Dr. Jose Reyes    Allergies: Patient has no known allergies.    Brenda is a 19 y.o.  at 35w5d gestational age here today for a NST.    2025, Date entered prior to episode creation    MEDICATIONS AT TIME OF TEST:    No current facility-administered medications for this encounter.     Current Outpatient Medications   Medication Sig Dispense Refill    aspirin 81 MG Chew Take 1 tablet (81 mg total) by mouth once daily. 30 tablet 11    NIFEdipine (PROCARDIA-XL) 60 MG (OSM) 24 hr tablet       prenatal vit/iron fum/folic ac (PRENATAL 1+1 ORAL) Take by mouth.      ondansetron (ZOFRAN-ODT) 4 MG TbDL Take 4 mg by mouth. (Patient not taking: Reported on 2024)         Indication: rule out uterine contractions    Interpretation:  145 BPM baseline    Variability:  Good {> 6 bpm)    Accelerations:  Reactive    Decelerations:  none    Assessment: reactive    Plan:  NST reactive      Jose Reyes MD  2025; 11:01 AM

## 2025-01-03 ENCOUNTER — HOSPITAL ENCOUNTER (OUTPATIENT)
Facility: HOSPITAL | Age: 20
Discharge: HOME | End: 2025-01-03
Attending: OBSTETRICS & GYNECOLOGY | Admitting: OBSTETRICS & GYNECOLOGY
Payer: MEDICAID

## 2025-01-03 VITALS
TEMPERATURE: 98 F | BODY MASS INDEX: 31.5 KG/M2 | HEART RATE: 90 BPM | RESPIRATION RATE: 16 BRPM | DIASTOLIC BLOOD PRESSURE: 83 MMHG | OXYGEN SATURATION: 99 % | WEIGHT: 196 LBS | HEIGHT: 66 IN | SYSTOLIC BLOOD PRESSURE: 139 MMHG

## 2025-01-03 DIAGNOSIS — O47.00 THREATENED PRETERM LABOR: ICD-10-CM

## 2025-01-03 LAB
CREAT UR-MCNC: 40.1 MG/DL (ref 15–325)
PROT UR-MCNC: <7 MG/DL (ref 0–15)
PROT/CREAT UR: NORMAL MG/G{CREAT} (ref 0–0.2)

## 2025-01-03 PROCEDURE — 99211 OFF/OP EST MAY X REQ PHY/QHP: CPT

## 2025-01-03 PROCEDURE — 59025 FETAL NON-STRESS TEST: CPT

## 2025-01-03 PROCEDURE — 25000003 PHARM REV CODE 250: Performed by: OBSTETRICS & GYNECOLOGY

## 2025-01-03 PROCEDURE — 82570 ASSAY OF URINE CREATININE: CPT | Performed by: OBSTETRICS & GYNECOLOGY

## 2025-01-03 RX ORDER — ONDANSETRON 8 MG/1
8 TABLET, ORALLY DISINTEGRATING ORAL EVERY 8 HOURS PRN
Status: DISCONTINUED | OUTPATIENT
Start: 2025-01-03 | End: 2025-01-03 | Stop reason: HOSPADM

## 2025-01-03 RX ORDER — BUTALBITAL, ACETAMINOPHEN AND CAFFEINE 50; 325; 40 MG/1; MG/1; MG/1
1 TABLET ORAL ONCE
Status: COMPLETED | OUTPATIENT
Start: 2025-01-03 | End: 2025-01-03

## 2025-01-03 RX ORDER — ACETAMINOPHEN 500 MG
500 TABLET ORAL EVERY 6 HOURS PRN
Status: DISCONTINUED | OUTPATIENT
Start: 2025-01-03 | End: 2025-01-03 | Stop reason: HOSPADM

## 2025-01-03 RX ADMIN — BUTALBITAL, ACETAMINOPHEN, AND CAFFEINE 1 TABLET: 50; 325; 40 TABLET ORAL at 08:01

## 2025-01-04 PROCEDURE — 59025 FETAL NON-STRESS TEST: CPT | Mod: 26,,, | Performed by: OBSTETRICS & GYNECOLOGY

## 2025-01-04 NOTE — PROCEDURES
"Brenda Perez is a 19 y.o. female patient.    Temp: 98 °F (36.7 °C) (01/03/25 2000)  Pulse: 90 (01/03/25 2026)  Resp: 16 (01/03/25 1958)  BP: 139/83 (01/03/25 2023)  SpO2: 99 % (01/03/25 2026)  Weight: 88.9 kg (196 lb) (01/03/25 2000)  Height: 5' 6" (167.6 cm) (01/03/25 2000)       Obtain Fetal nonstress test (NST)    Date/Time: 1/4/2025 10:19 AM    Performed by: Bailey Calderon MD  Authorized by: Bailey Calderon MD    Nonstress Test:     Variability:  6-25 BPM    Decelerations:  None    Accelerations:  15 bpm    Baseline:  135    Contractions:  Irregular  Biophysical Profile:     Nonstress Test Interpretation: reactive      Overall Impression:  Reassuring  Post-procedure:     Patient tolerance:  Patient tolerated the procedure well with no immediate complications      1/4/2025    "

## 2025-01-04 NOTE — DISCHARGE INSTRUCTIONS
Return to the labor unit or call ob nurse at hospital if:    1.  Any questions whether the water bag broke or is leaking (sudden gush or suspected leak).   2.  If 35 wks or greater, no need to call about passing the mucous plug if no other signs of labor.  3.  You may have spotting for a day or two from the vaginal exam  4.  Please report heavier vaginal bleeding (requires you to put a pad on or blood is running down your leg)  5. Report if you are having more than 6 contractions in an hour and less than 37 weeks, but at 37-38 wks. and beyond you can time your contractions and notify doctor if they are longer, stronger and closer together  6.  You should call if you are experiencing sharp abdominal pains or severe cramping.    7.  If you are > or = 28 wks, Do fetal kick counts 2 times a day  &  report decreased fetal movement:  You should feel 10 movements in 2 hours or less.  Notify MD or OB nurse as soon as possible if you are not getting the required movements or if  it is taking you longer and longer to get the 10 movements and DO NOT WAIT UNTIL TOMORROW EVEN IF YOU HAVE AN APPT.  8.  Drink plenty of water and empty your bladder often, avoid caffeine & carbonated beverages as much as possible & avoid smoking  9.  Keep your appt. as scheduled    Office phone # [phone]  If after office hours, on the weekend, or unable to reach the nurse at the office, call the Hospital phone # [phone] & ask to speak to the OB nurse

## 2025-01-04 NOTE — NURSING
OB TRIAGE ASSESSMENT    RE: Brenda Perez  MRN:  [id number]  :  2005  AGE:  19 y.o.    Date:  1/3/2025    PHYSICIAN: Bailey Calderon MD    Allergies: Patient has no known allergies.    Brenda is a 19 y.o.  at 35w6d gestational age presents with complaint(s) of Pt presents to L&D with c/o contractions, headache not relieved with tylenol, vision disturbance and intermittent swelling.  Reports +FM, denies vaginal bleeding and or leaking of fluid.  Pt currently on Procardia XL 60mg for htn. Last tylenol dose 2 hours pta      Triage Course:    Patient was evaluated in triage on L&D.  NST was appropriate for GA. Patient was monitored for 30 minutes.  Cervix was 0.5/50/-4.  She received the following medications: Fioricet   The following labs were performed: P/C ratio  Plan of care was discussed with MD on call.   Patient discharged home with return precautions and plans for routine follow up.      NST:    135 BPM baseline  Variability:  Good {> 6 bpm)  Accelerations:  present  Decelerations:  none    [place]: contractions occasional           Lakeisha Chanel   1/3/2025; 8:05 PM

## 2025-01-07 ENCOUNTER — PROCEDURE VISIT (OUTPATIENT)
Dept: MATERNAL FETAL MEDICINE | Facility: CLINIC | Age: 20
End: 2025-01-07
Payer: MEDICAID

## 2025-01-07 ENCOUNTER — ROUTINE PRENATAL (OUTPATIENT)
Dept: OBSTETRICS AND GYNECOLOGY | Facility: CLINIC | Age: 20
End: 2025-01-07
Payer: MEDICAID

## 2025-01-07 VITALS
HEART RATE: 98 BPM | BODY MASS INDEX: 32.18 KG/M2 | WEIGHT: 199.38 LBS | DIASTOLIC BLOOD PRESSURE: 78 MMHG | SYSTOLIC BLOOD PRESSURE: 112 MMHG

## 2025-01-07 DIAGNOSIS — O09.893 SUPERVISION OF OTHER HIGH RISK PREGNANCIES, THIRD TRIMESTER: ICD-10-CM

## 2025-01-07 DIAGNOSIS — O09.93 SUPERVISION OF HIGH-RISK PREGNANCY, THIRD TRIMESTER: ICD-10-CM

## 2025-01-07 DIAGNOSIS — O40.3XX0 POLYHYDRAMNIOS IN THIRD TRIMESTER COMPLICATION, SINGLE OR UNSPECIFIED FETUS: ICD-10-CM

## 2025-01-07 DIAGNOSIS — O99.210 OBESITY AFFECTING PREGNANCY, ANTEPARTUM, UNSPECIFIED OBESITY TYPE: ICD-10-CM

## 2025-01-07 DIAGNOSIS — Z3A.36 36 WEEKS GESTATION OF PREGNANCY: ICD-10-CM

## 2025-01-07 DIAGNOSIS — O10.919 CHRONIC HYPERTENSION AFFECTING PREGNANCY: ICD-10-CM

## 2025-01-07 DIAGNOSIS — O10.913 CHRONIC HYPERTENSION COMPLICATING OR REASON FOR CARE DURING PREGNANCY, THIRD TRIMESTER: ICD-10-CM

## 2025-01-07 DIAGNOSIS — Z34.93 ENCOUNTER FOR PREGNANCY RELATED EXAMINATION IN THIRD TRIMESTER: Primary | ICD-10-CM

## 2025-01-07 PROCEDURE — 76819 FETAL BIOPHYS PROFIL W/O NST: CPT | Mod: PBBFAC | Performed by: OBSTETRICS & GYNECOLOGY

## 2025-01-07 PROCEDURE — 87653 STREP B DNA AMP PROBE: CPT | Performed by: OBSTETRICS & GYNECOLOGY

## 2025-01-07 PROCEDURE — 99212 OFFICE O/P EST SF 10 MIN: CPT | Mod: PBBFAC,TH | Performed by: OBSTETRICS & GYNECOLOGY

## 2025-01-07 PROCEDURE — 99999 PR PBB SHADOW E&M-EST. PATIENT-LVL II: CPT | Mod: PBBFAC,,, | Performed by: OBSTETRICS & GYNECOLOGY

## 2025-01-07 PROCEDURE — 76816 OB US FOLLOW-UP PER FETUS: CPT | Mod: 26,S$PBB,, | Performed by: OBSTETRICS & GYNECOLOGY

## 2025-01-07 RX ORDER — DIPHENOXYLATE HYDROCHLORIDE AND ATROPINE SULFATE 2.5; .025 MG/1; MG/1
2 TABLET ORAL EVERY 6 HOURS PRN
OUTPATIENT
Start: 2025-01-07

## 2025-01-07 RX ORDER — MISOPROSTOL 200 UG/1
800 TABLET ORAL ONCE AS NEEDED
OUTPATIENT
Start: 2025-01-07

## 2025-01-07 RX ORDER — TERBUTALINE SULFATE 1 MG/ML
0.25 INJECTION SUBCUTANEOUS
OUTPATIENT
Start: 2025-01-07

## 2025-01-07 RX ORDER — SIMETHICONE 80 MG
1 TABLET,CHEWABLE ORAL 4 TIMES DAILY PRN
OUTPATIENT
Start: 2025-01-07

## 2025-01-07 RX ORDER — ONDANSETRON 8 MG/1
8 TABLET, ORALLY DISINTEGRATING ORAL EVERY 8 HOURS PRN
OUTPATIENT
Start: 2025-01-07

## 2025-01-07 RX ORDER — LIDOCAINE HYDROCHLORIDE 10 MG/ML
10 INJECTION, SOLUTION INFILTRATION; PERINEURAL ONCE AS NEEDED
OUTPATIENT
Start: 2025-01-07 | End: 2036-06-05

## 2025-01-07 RX ORDER — OXYTOCIN 10 [USP'U]/ML
10 INJECTION, SOLUTION INTRAMUSCULAR; INTRAVENOUS ONCE AS NEEDED
OUTPATIENT
Start: 2025-01-07 | End: 2036-06-05

## 2025-01-07 RX ORDER — MISOPROSTOL 200 UG/1
800 TABLET ORAL ONCE AS NEEDED
OUTPATIENT
Start: 2025-01-07 | End: 2036-06-05

## 2025-01-07 RX ORDER — SODIUM CHLORIDE 9 MG/ML
INJECTION, SOLUTION INTRAVENOUS
OUTPATIENT
Start: 2025-01-07

## 2025-01-07 RX ORDER — MISOPROSTOL 100 MCG
25 TABLET ORAL ONCE
OUTPATIENT
Start: 2025-01-07

## 2025-01-07 RX ORDER — OXYTOCIN-SODIUM CHLORIDE 0.9% IV SOLN 30 UNIT/500ML 30-0.9/5 UT/ML-%
30 SOLUTION INTRAVENOUS ONCE AS NEEDED
OUTPATIENT
Start: 2025-01-07 | End: 2036-06-05

## 2025-01-07 RX ORDER — MUPIROCIN 20 MG/G
OINTMENT TOPICAL
OUTPATIENT
Start: 2025-01-07

## 2025-01-07 RX ORDER — OXYTOCIN-SODIUM CHLORIDE 0.9% IV SOLN 30 UNIT/500ML 30-0.9/5 UT/ML-%
95 SOLUTION INTRAVENOUS CONTINUOUS PRN
OUTPATIENT
Start: 2025-01-07

## 2025-01-07 RX ORDER — OXYTOCIN-SODIUM CHLORIDE 0.9% IV SOLN 30 UNIT/500ML 30-0.9/5 UT/ML-%
10 SOLUTION INTRAVENOUS ONCE AS NEEDED
OUTPATIENT
Start: 2025-01-07 | End: 2036-06-05

## 2025-01-07 RX ORDER — SODIUM CHLORIDE, SODIUM LACTATE, POTASSIUM CHLORIDE, CALCIUM CHLORIDE 600; 310; 30; 20 MG/100ML; MG/100ML; MG/100ML; MG/100ML
INJECTION, SOLUTION INTRAVENOUS CONTINUOUS
OUTPATIENT
Start: 2025-01-07

## 2025-01-07 RX ORDER — OXYTOCIN-SODIUM CHLORIDE 0.9% IV SOLN 30 UNIT/500ML 30-0.9/5 UT/ML-%
95 SOLUTION INTRAVENOUS ONCE AS NEEDED
OUTPATIENT
Start: 2025-01-07 | End: 2036-06-05

## 2025-01-07 RX ORDER — OXYTOCIN-SODIUM CHLORIDE 0.9% IV SOLN 30 UNIT/500ML 30-0.9/5 UT/ML-%
0-32 SOLUTION INTRAVENOUS CONTINUOUS
OUTPATIENT
Start: 2025-01-07

## 2025-01-07 RX ORDER — CALCIUM CARBONATE 200(500)MG
500 TABLET,CHEWABLE ORAL 3 TIMES DAILY PRN
OUTPATIENT
Start: 2025-01-07

## 2025-01-07 RX ORDER — METHYLERGONOVINE MALEATE 0.2 MG/ML
200 INJECTION INTRAVENOUS ONCE AS NEEDED
OUTPATIENT
Start: 2025-01-07 | End: 2036-06-05

## 2025-01-07 RX ORDER — MISOPROSTOL 100 MCG
25 TABLET ORAL EVERY 4 HOURS PRN
OUTPATIENT
Start: 2025-01-07

## 2025-01-07 RX ORDER — CARBOPROST TROMETHAMINE 250 UG/ML
250 INJECTION, SOLUTION INTRAMUSCULAR
OUTPATIENT
Start: 2025-01-07

## 2025-01-07 RX ORDER — BUTORPHANOL TARTRATE 2 MG/ML
2 INJECTION INTRAMUSCULAR; INTRAVENOUS EVERY 4 HOURS PRN
OUTPATIENT
Start: 2025-01-07

## 2025-01-07 NOTE — PROGRESS NOTES
Patient with no complaints. Denies vaginal bleeding or contractions. Good FM. GBS collected today. Labor precautions discused with patient.     CHTN  Procardia 60XL  Polyhydramnios  Induction scheduled    Vitals signs, FHTs, urine dip, and PE findings documented, reviewed and available in OB flow chart.       I spent a total of 20 minutes on the day of the visit.This includes face to face time and non-face to face time preparing to see the patient (eg, review of tests), Obtaining and/or reviewing separately obtained history, Documenting clinical information in the electronic or other health record, Independently interpreting resultsand communicating results to the patient/family/caregiver, or Care coordination.

## 2025-01-08 ENCOUNTER — PATIENT MESSAGE (OUTPATIENT)
Dept: OBSTETRICS AND GYNECOLOGY | Facility: CLINIC | Age: 20
End: 2025-01-08
Payer: MEDICAID

## 2025-01-08 LAB — GROUP B STREPTOCOCCUS, PCR: NEGATIVE

## 2025-01-08 NOTE — TELEPHONE ENCOUNTER
Pt requesting note to start maternity leave now due to nausea and unable to perform her work duties as she wishes due to contractions. Pt is scheduled for induction on Monday. Please advise. Thank you.

## 2025-01-08 NOTE — LETTER
January 9, 2025      [facility]  [address] [place] [id number]  Phone: [phone]       Patient: Brenda Perez   YOB: 2005  Date of Visit: 1/7/2025    To Whom It May Concern:    Brenda Perez was at [facility] on 1/7/2025.She will be starting her maternity leave on 1/8/2025 due to uterine contractions. If you have any questions or concerns, or if I can be of further assistance, please do not hesitate to contact me.    Sincerely,    Dr. Marley Bridges

## 2025-01-13 ENCOUNTER — HOSPITAL ENCOUNTER (INPATIENT)
Facility: HOSPITAL | Age: 20
LOS: 2 days | Discharge: HOME | End: 2025-01-15
Attending: OBSTETRICS & GYNECOLOGY | Admitting: OBSTETRICS & GYNECOLOGY
Payer: MEDICAID

## 2025-01-13 DIAGNOSIS — O40.3XX0 POLYHYDRAMNIOS IN THIRD TRIMESTER COMPLICATION, SINGLE OR UNSPECIFIED FETUS: ICD-10-CM

## 2025-01-13 DIAGNOSIS — O09.93 SUPERVISION OF HIGH-RISK PREGNANCY, THIRD TRIMESTER: ICD-10-CM

## 2025-01-13 DIAGNOSIS — Z34.93 ENCOUNTER FOR PREGNANCY RELATED EXAMINATION IN THIRD TRIMESTER: ICD-10-CM

## 2025-01-13 DIAGNOSIS — O10.919 CHRONIC HYPERTENSION AFFECTING PREGNANCY: ICD-10-CM

## 2025-01-13 LAB
ABO + RH BLD: NORMAL
ALBUMIN SERPL BCP-MCNC: 3 G/DL (ref 3.5–5.2)
ALP SERPL-CCNC: 188 U/L (ref 40–150)
ALT SERPL W/O P-5'-P-CCNC: 11 U/L (ref 10–44)
ANION GAP SERPL CALC-SCNC: 11 MMOL/L (ref 8–16)
AST SERPL-CCNC: 13 U/L (ref 10–40)
BASOPHILS # BLD AUTO: 0.03 K/UL (ref 0–0.2)
BASOPHILS NFR BLD: 0.3 % (ref 0–1.9)
BILIRUB SERPL-MCNC: 0.3 MG/DL (ref 0.1–1)
BLD GP AB SCN CELLS X3 SERPL QL: NORMAL
BUN SERPL-MCNC: 5 MG/DL (ref 6–20)
CALCIUM SERPL-MCNC: 8.8 MG/DL (ref 8.7–10.5)
CHLORIDE SERPL-SCNC: 108 MMOL/L (ref 95–110)
CO2 SERPL-SCNC: 18 MMOL/L (ref 23–29)
CREAT SERPL-MCNC: 0.5 MG/DL (ref 0.5–1.4)
DIFFERENTIAL METHOD BLD: ABNORMAL
EOSINOPHIL # BLD AUTO: 0.5 K/UL (ref 0–0.5)
EOSINOPHIL NFR BLD: 4.7 % (ref 0–8)
ERYTHROCYTE [DISTWIDTH] IN BLOOD BY AUTOMATED COUNT: 13.5 % (ref 11.5–14.5)
EST. GFR  (NO RACE VARIABLE): >60 ML/MIN/1.73 M^2
GLUCOSE SERPL-MCNC: 102 MG/DL (ref 70–110)
HCT VFR BLD AUTO: 31.6 % (ref 37–48.5)
HGB BLD-MCNC: 10.6 G/DL (ref 12–16)
IMM GRANULOCYTES # BLD AUTO: 0.05 K/UL (ref 0–0.04)
IMM GRANULOCYTES NFR BLD AUTO: 0.5 % (ref 0–0.5)
LYMPHOCYTES # BLD AUTO: 2.5 K/UL (ref 1–4.8)
LYMPHOCYTES NFR BLD: 23.6 % (ref 18–48)
MCH RBC QN AUTO: 26.2 PG (ref 27–31)
MCHC RBC AUTO-ENTMCNC: 33.5 G/DL (ref 32–36)
MCV RBC AUTO: 78 FL (ref 82–98)
MONOCYTES # BLD AUTO: 0.7 K/UL (ref 0.3–1)
MONOCYTES NFR BLD: 6.6 % (ref 4–15)
NEUTROPHILS # BLD AUTO: 6.8 K/UL (ref 1.8–7.7)
NEUTROPHILS NFR BLD: 64.3 % (ref 38–73)
NRBC BLD-RTO: 0 /100 WBC
PLATELET # BLD AUTO: 205 K/UL (ref 150–450)
PMV BLD AUTO: 10.2 FL (ref 9.2–12.9)
POTASSIUM SERPL-SCNC: 3.6 MMOL/L (ref 3.5–5.1)
PROT SERPL-MCNC: 6.5 G/DL (ref 6–8.4)
RBC # BLD AUTO: 4.04 M/UL (ref 4–5.4)
SODIUM SERPL-SCNC: 137 MMOL/L (ref 136–145)
WBC # BLD AUTO: 10.53 K/UL (ref 3.9–12.7)

## 2025-01-13 PROCEDURE — 85025 COMPLETE CBC W/AUTO DIFF WBC: CPT | Performed by: OBSTETRICS & GYNECOLOGY

## 2025-01-13 PROCEDURE — 86593 SYPHILIS TEST NON-TREP QUANT: CPT | Performed by: OBSTETRICS & GYNECOLOGY

## 2025-01-13 PROCEDURE — 11000001 HC ACUTE MED/SURG PRIVATE ROOM

## 2025-01-13 PROCEDURE — 59025 FETAL NON-STRESS TEST: CPT

## 2025-01-13 PROCEDURE — 25000003 PHARM REV CODE 250: Performed by: OBSTETRICS & GYNECOLOGY

## 2025-01-13 PROCEDURE — 86901 BLOOD TYPING SEROLOGIC RH(D): CPT | Performed by: OBSTETRICS & GYNECOLOGY

## 2025-01-13 PROCEDURE — 80053 COMPREHEN METABOLIC PANEL: CPT | Performed by: OBSTETRICS & GYNECOLOGY

## 2025-01-13 PROCEDURE — 72100002 HC LABOR CARE, 1ST 8 HOURS

## 2025-01-13 PROCEDURE — 36415 COLL VENOUS BLD VENIPUNCTURE: CPT | Performed by: OBSTETRICS & GYNECOLOGY

## 2025-01-13 RX ORDER — SODIUM CHLORIDE, SODIUM LACTATE, POTASSIUM CHLORIDE, CALCIUM CHLORIDE 600; 310; 30; 20 MG/100ML; MG/100ML; MG/100ML; MG/100ML
INJECTION, SOLUTION INTRAVENOUS CONTINUOUS
Status: DISCONTINUED | OUTPATIENT
Start: 2025-01-13 | End: 2025-01-14

## 2025-01-13 RX ORDER — CALCIUM CARBONATE 200(500)MG
500 TABLET,CHEWABLE ORAL 3 TIMES DAILY PRN
Status: DISCONTINUED | OUTPATIENT
Start: 2025-01-13 | End: 2025-01-14

## 2025-01-13 RX ORDER — MISOPROSTOL 100 MCG
25 TABLET ORAL ONCE
Status: COMPLETED | OUTPATIENT
Start: 2025-01-13 | End: 2025-01-13

## 2025-01-13 RX ORDER — SODIUM CHLORIDE 9 MG/ML
INJECTION, SOLUTION INTRAVENOUS
Status: DISCONTINUED | OUTPATIENT
Start: 2025-01-13 | End: 2025-01-14

## 2025-01-13 RX ORDER — SIMETHICONE 80 MG
1 TABLET,CHEWABLE ORAL 4 TIMES DAILY PRN
Status: DISCONTINUED | OUTPATIENT
Start: 2025-01-13 | End: 2025-01-14

## 2025-01-13 RX ORDER — MUPIROCIN 20 MG/G
OINTMENT TOPICAL
Status: DISCONTINUED | OUTPATIENT
Start: 2025-01-13 | End: 2025-01-14

## 2025-01-13 RX ORDER — ONDANSETRON 8 MG/1
8 TABLET, ORALLY DISINTEGRATING ORAL EVERY 8 HOURS PRN
Status: DISCONTINUED | OUTPATIENT
Start: 2025-01-13 | End: 2025-01-14

## 2025-01-13 RX ORDER — TERBUTALINE SULFATE 1 MG/ML
0.25 INJECTION SUBCUTANEOUS
Status: DISCONTINUED | OUTPATIENT
Start: 2025-01-13 | End: 2025-01-14

## 2025-01-13 RX ORDER — OXYTOCIN-SODIUM CHLORIDE 0.9% IV SOLN 30 UNIT/500ML 30-0.9/5 UT/ML-%
10 SOLUTION INTRAVENOUS ONCE AS NEEDED
Status: COMPLETED | OUTPATIENT
Start: 2025-01-13 | End: 2025-01-14

## 2025-01-13 RX ORDER — MISOPROSTOL 100 MCG
25 TABLET ORAL EVERY 4 HOURS PRN
Status: DISCONTINUED | OUTPATIENT
Start: 2025-01-13 | End: 2025-01-14

## 2025-01-13 RX ORDER — LIDOCAINE HYDROCHLORIDE 10 MG/ML
10 INJECTION, SOLUTION INFILTRATION; PERINEURAL ONCE AS NEEDED
Status: DISCONTINUED | OUTPATIENT
Start: 2025-01-13 | End: 2025-01-14

## 2025-01-13 RX ORDER — OXYTOCIN-SODIUM CHLORIDE 0.9% IV SOLN 30 UNIT/500ML 30-0.9/5 UT/ML-%
0-32 SOLUTION INTRAVENOUS CONTINUOUS
Status: DISCONTINUED | OUTPATIENT
Start: 2025-01-13 | End: 2025-01-14

## 2025-01-13 RX ADMIN — MISOPROSTOL 25 MCG: 100 TABLET ORAL at 09:01

## 2025-01-14 ENCOUNTER — ANESTHESIA (OUTPATIENT)
Dept: OBSTETRICS AND GYNECOLOGY | Facility: HOSPITAL | Age: 20
End: 2025-01-14
Payer: MEDICAID

## 2025-01-14 ENCOUNTER — ANESTHESIA EVENT (OUTPATIENT)
Dept: OBSTETRICS AND GYNECOLOGY | Facility: HOSPITAL | Age: 20
End: 2025-01-14
Payer: MEDICAID

## 2025-01-14 PROBLEM — O40.3XX0 POLYHYDRAMNIOS IN THIRD TRIMESTER: Status: ACTIVE | Noted: 2025-01-14

## 2025-01-14 PROBLEM — O10.919 CHRONIC HYPERTENSION AFFECTING PREGNANCY: Status: ACTIVE | Noted: 2022-11-01

## 2025-01-14 LAB — TREPONEMA PALLIDUM IGG+IGM AB [PRESENCE] IN SERUM OR PLASMA BY IMMUNOASSAY: NONREACTIVE

## 2025-01-14 PROCEDURE — 01967 NEURAXL LBR ANES VAG DLVR: CPT | Mod: QZ | Performed by: NURSE ANESTHETIST, CERTIFIED REGISTERED

## 2025-01-14 PROCEDURE — 63600175 PHARM REV CODE 636 W HCPCS: Performed by: OBSTETRICS & GYNECOLOGY

## 2025-01-14 PROCEDURE — 59409 OBSTETRICAL CARE: CPT | Mod: AT,,, | Performed by: OBSTETRICS & GYNECOLOGY

## 2025-01-14 PROCEDURE — 72100003 HC LABOR CARE, EA. ADDL. 8 HRS

## 2025-01-14 PROCEDURE — 11000001 HC ACUTE MED/SURG PRIVATE ROOM

## 2025-01-14 PROCEDURE — 3E0DXGC INTRODUCTION OF OTHER THERAPEUTIC SUBSTANCE INTO MOUTH AND PHARYNX, EXTERNAL APPROACH: ICD-10-PCS | Performed by: OBSTETRICS & GYNECOLOGY

## 2025-01-14 PROCEDURE — 72200005 HC VAGINAL DELIVERY LEVEL II

## 2025-01-14 PROCEDURE — 59409AH PRA ETRICAL CARE,VAG DELIV ONLY: Mod: QZ | Performed by: NURSE ANESTHETIST, CERTIFIED REGISTERED

## 2025-01-14 PROCEDURE — 63600175 PHARM REV CODE 636 W HCPCS: Performed by: NURSE ANESTHETIST, CERTIFIED REGISTERED

## 2025-01-14 PROCEDURE — 62326 NJX INTERLAMINAR LMBR/SAC: CPT | Performed by: NURSE ANESTHETIST, CERTIFIED REGISTERED

## 2025-01-14 PROCEDURE — 25000003 PHARM REV CODE 250: Performed by: OBSTETRICS & GYNECOLOGY

## 2025-01-14 PROCEDURE — 10907ZC DRAINAGE OF AMNIOTIC FLUID, THERAPEUTIC FROM PRODUCTS OF CONCEPTION, VIA NATURAL OR ARTIFICIAL OPENING: ICD-10-PCS | Performed by: OBSTETRICS & GYNECOLOGY

## 2025-01-14 RX ORDER — MISOPROSTOL 200 UG/1
800 TABLET ORAL ONCE AS NEEDED
Status: CANCELLED | OUTPATIENT
Start: 2025-01-14

## 2025-01-14 RX ORDER — DIPHENHYDRAMINE HYDROCHLORIDE 50 MG/ML
25 INJECTION INTRAMUSCULAR; INTRAVENOUS EVERY 4 HOURS PRN
Status: DISCONTINUED | OUTPATIENT
Start: 2025-01-14 | End: 2025-01-15 | Stop reason: HOSPADM

## 2025-01-14 RX ORDER — CARBOPROST TROMETHAMINE 250 UG/ML
250 INJECTION, SOLUTION INTRAMUSCULAR
Status: DISCONTINUED | OUTPATIENT
Start: 2025-01-14 | End: 2025-01-15 | Stop reason: HOSPADM

## 2025-01-14 RX ORDER — SODIUM CHLORIDE 0.9 % (FLUSH) 0.9 %
10 SYRINGE (ML) INJECTION
Status: DISCONTINUED | OUTPATIENT
Start: 2025-01-14 | End: 2025-01-15 | Stop reason: HOSPADM

## 2025-01-14 RX ORDER — SIMETHICONE 80 MG
1 TABLET,CHEWABLE ORAL EVERY 6 HOURS PRN
Status: DISCONTINUED | OUTPATIENT
Start: 2025-01-14 | End: 2025-01-15 | Stop reason: HOSPADM

## 2025-01-14 RX ORDER — ROPIVACAINE HYDROCHLORIDE 2 MG/ML
INJECTION, SOLUTION EPIDURAL; INFILTRATION CONTINUOUS PRN
Status: DISCONTINUED | OUTPATIENT
Start: 2025-01-14 | End: 2025-01-14

## 2025-01-14 RX ORDER — NIFEDIPINE 30 MG/1
60 TABLET, EXTENDED RELEASE ORAL DAILY
Status: DISCONTINUED | OUTPATIENT
Start: 2025-01-14 | End: 2025-01-15 | Stop reason: HOSPADM

## 2025-01-14 RX ORDER — METHYLERGONOVINE MALEATE 0.2 MG/ML
200 INJECTION INTRAVENOUS ONCE AS NEEDED
Status: CANCELLED | OUTPATIENT
Start: 2025-01-14 | End: 2036-06-12

## 2025-01-14 RX ORDER — PRENATAL WITH FERROUS FUM AND FOLIC ACID 3080; 920; 120; 400; 22; 1.84; 3; 20; 10; 1; 12; 200; 27; 25; 2 [IU]/1; [IU]/1; MG/1; [IU]/1; MG/1; MG/1; MG/1; MG/1; MG/1; MG/1; UG/1; MG/1; MG/1; MG/1; MG/1
1 TABLET ORAL DAILY
Status: DISCONTINUED | OUTPATIENT
Start: 2025-01-14 | End: 2025-01-15 | Stop reason: HOSPADM

## 2025-01-14 RX ORDER — HYDROCODONE BITARTRATE AND ACETAMINOPHEN 5; 325 MG/1; MG/1
1 TABLET ORAL EVERY 4 HOURS PRN
Status: DISCONTINUED | OUTPATIENT
Start: 2025-01-14 | End: 2025-01-15 | Stop reason: HOSPADM

## 2025-01-14 RX ORDER — DIPHENHYDRAMINE HCL 25 MG
25 CAPSULE ORAL EVERY 4 HOURS PRN
Status: DISCONTINUED | OUTPATIENT
Start: 2025-01-14 | End: 2025-01-15 | Stop reason: HOSPADM

## 2025-01-14 RX ORDER — TRANEXAMIC ACID 10 MG/ML
1000 INJECTION, SOLUTION INTRAVENOUS EVERY 30 MIN PRN
Status: DISCONTINUED | OUTPATIENT
Start: 2025-01-14 | End: 2025-01-15 | Stop reason: HOSPADM

## 2025-01-14 RX ORDER — IBUPROFEN 600 MG/1
600 TABLET ORAL EVERY 6 HOURS PRN
Status: DISCONTINUED | OUTPATIENT
Start: 2025-01-14 | End: 2025-01-15 | Stop reason: HOSPADM

## 2025-01-14 RX ORDER — FENTANYL CITRATE 50 UG/ML
INJECTION, SOLUTION INTRAMUSCULAR; INTRAVENOUS
Status: DISCONTINUED | OUTPATIENT
Start: 2025-01-14 | End: 2025-01-14

## 2025-01-14 RX ORDER — MISOPROSTOL 200 UG/1
800 TABLET ORAL ONCE AS NEEDED
Status: DISCONTINUED | OUTPATIENT
Start: 2025-01-14 | End: 2025-01-15 | Stop reason: HOSPADM

## 2025-01-14 RX ORDER — ONDANSETRON 8 MG/1
8 TABLET, ORALLY DISINTEGRATING ORAL EVERY 8 HOURS PRN
Status: DISCONTINUED | OUTPATIENT
Start: 2025-01-14 | End: 2025-01-15 | Stop reason: HOSPADM

## 2025-01-14 RX ORDER — DOCUSATE SODIUM 100 MG/1
200 CAPSULE, LIQUID FILLED ORAL 2 TIMES DAILY PRN
Status: DISCONTINUED | OUTPATIENT
Start: 2025-01-14 | End: 2025-01-15 | Stop reason: HOSPADM

## 2025-01-14 RX ORDER — DIPHENOXYLATE HYDROCHLORIDE AND ATROPINE SULFATE 2.5; .025 MG/1; MG/1
2 TABLET ORAL EVERY 6 HOURS PRN
Status: DISCONTINUED | OUTPATIENT
Start: 2025-01-14 | End: 2025-01-15 | Stop reason: HOSPADM

## 2025-01-14 RX ORDER — OXYTOCIN-SODIUM CHLORIDE 0.9% IV SOLN 30 UNIT/500ML 30-0.9/5 UT/ML-%
95 SOLUTION INTRAVENOUS ONCE AS NEEDED
Status: DISCONTINUED | OUTPATIENT
Start: 2025-01-14 | End: 2025-01-15 | Stop reason: HOSPADM

## 2025-01-14 RX ORDER — OXYTOCIN 10 [USP'U]/ML
10 INJECTION, SOLUTION INTRAMUSCULAR; INTRAVENOUS ONCE AS NEEDED
Status: DISCONTINUED | OUTPATIENT
Start: 2025-01-14 | End: 2025-01-15 | Stop reason: HOSPADM

## 2025-01-14 RX ORDER — ACETAMINOPHEN 325 MG/1
650 TABLET ORAL EVERY 6 HOURS SCHEDULED
Status: DISCONTINUED | OUTPATIENT
Start: 2025-01-14 | End: 2025-01-15 | Stop reason: HOSPADM

## 2025-01-14 RX ORDER — OXYTOCIN-SODIUM CHLORIDE 0.9% IV SOLN 30 UNIT/500ML 30-0.9/5 UT/ML-%
95 SOLUTION INTRAVENOUS CONTINUOUS PRN
Status: CANCELLED | OUTPATIENT
Start: 2025-01-14

## 2025-01-14 RX ORDER — MISOPROSTOL 200 UG/1
800 TABLET ORAL ONCE AS NEEDED
Status: COMPLETED | OUTPATIENT
Start: 2025-01-14 | End: 2025-01-14

## 2025-01-14 RX ORDER — OXYTOCIN-SODIUM CHLORIDE 0.9% IV SOLN 30 UNIT/500ML 30-0.9/5 UT/ML-%
95 SOLUTION INTRAVENOUS CONTINUOUS PRN
Status: DISCONTINUED | OUTPATIENT
Start: 2025-01-14 | End: 2025-01-14

## 2025-01-14 RX ORDER — DIPHENOXYLATE HYDROCHLORIDE AND ATROPINE SULFATE 2.5; .025 MG/1; MG/1
2 TABLET ORAL EVERY 6 HOURS PRN
Status: CANCELLED | OUTPATIENT
Start: 2025-01-14

## 2025-01-14 RX ORDER — OXYTOCIN-SODIUM CHLORIDE 0.9% IV SOLN 30 UNIT/500ML 30-0.9/5 UT/ML-%
30 SOLUTION INTRAVENOUS ONCE AS NEEDED
Status: DISCONTINUED | OUTPATIENT
Start: 2025-01-14 | End: 2025-01-14

## 2025-01-14 RX ORDER — HYDROCORTISONE 25 MG/G
CREAM TOPICAL 3 TIMES DAILY PRN
Status: DISCONTINUED | OUTPATIENT
Start: 2025-01-14 | End: 2025-01-15 | Stop reason: HOSPADM

## 2025-01-14 RX ORDER — OXYTOCIN-SODIUM CHLORIDE 0.9% IV SOLN 30 UNIT/500ML 30-0.9/5 UT/ML-%
30 SOLUTION INTRAVENOUS ONCE AS NEEDED
Status: CANCELLED | OUTPATIENT
Start: 2025-01-14 | End: 2036-06-12

## 2025-01-14 RX ORDER — CARBOPROST TROMETHAMINE 250 UG/ML
250 INJECTION, SOLUTION INTRAMUSCULAR
Status: CANCELLED | OUTPATIENT
Start: 2025-01-14

## 2025-01-14 RX ORDER — MISOPROSTOL 200 UG/1
800 TABLET ORAL ONCE AS NEEDED
Status: CANCELLED | OUTPATIENT
Start: 2025-01-14 | End: 2036-06-12

## 2025-01-14 RX ADMIN — TRANEXAMIC ACID 1000 MG: 10 INJECTION, SOLUTION INTRAVENOUS at 09:01

## 2025-01-14 RX ADMIN — IBUPROFEN 600 MG: 600 TABLET ORAL at 10:01

## 2025-01-14 RX ADMIN — MISOPROSTOL 800 MCG: 200 TABLET ORAL at 09:01

## 2025-01-14 RX ADMIN — ONDANSETRON 8 MG: 8 TABLET, ORALLY DISINTEGRATING ORAL at 08:01

## 2025-01-14 RX ADMIN — IBUPROFEN 600 MG: 600 TABLET ORAL at 08:01

## 2025-01-14 RX ADMIN — HYDROCODONE BITARTRATE AND ACETAMINOPHEN 1 TABLET: 5; 325 TABLET ORAL at 08:01

## 2025-01-14 RX ADMIN — ACETAMINOPHEN 650 MG: 325 TABLET ORAL at 10:01

## 2025-01-14 RX ADMIN — SODIUM CHLORIDE, POTASSIUM CHLORIDE, SODIUM LACTATE AND CALCIUM CHLORIDE: 600; 310; 30; 20 INJECTION, SOLUTION INTRAVENOUS at 07:01

## 2025-01-14 RX ADMIN — SODIUM CHLORIDE, POTASSIUM CHLORIDE, SODIUM LACTATE AND CALCIUM CHLORIDE: 600; 310; 30; 20 INJECTION, SOLUTION INTRAVENOUS at 02:01

## 2025-01-14 RX ADMIN — NIFEDIPINE 60 MG: 30 TABLET, FILM COATED, EXTENDED RELEASE ORAL at 10:01

## 2025-01-14 RX ADMIN — ACETAMINOPHEN 650 MG: 325 TABLET ORAL at 04:01

## 2025-01-14 RX ADMIN — ROPIVACAINE HYDROCHLORIDE 12 ML/HR: 2 INJECTION, SOLUTION EPIDURAL; INFILTRATION at 07:01

## 2025-01-14 RX ADMIN — OXYTOCIN-SODIUM CHLORIDE 0.9% IV SOLN 30 UNIT/500ML 10 UNITS: 30-0.9/5 SOLUTION at 08:01

## 2025-01-14 RX ADMIN — Medication 4 MILLI-UNITS/MIN: at 02:01

## 2025-01-14 RX ADMIN — ONDANSETRON 8 MG: 8 TABLET, ORALLY DISINTEGRATING ORAL at 10:01

## 2025-01-14 RX ADMIN — FENTANYL CITRATE 200 MCG: 50 INJECTION, SOLUTION INTRAMUSCULAR; INTRAVENOUS at 07:01

## 2025-01-14 NOTE — L&D DELIVERY NOTE
[facility] - Labor & Delivery  Vaginal Delivery   Operative Note    SUMMARY     Normal spontaneous vaginal delivery of live infant, was placed on mothers abdomen for skin to skin and bulb suctioning performed.  Infant delivered position ROP over intact perineum.  Nuchal cord: No.    Spontaneous delivery of placenta and IV pitocin given noting good uterine tone.  No lacerations noted.  Patient tolerated delivery well. Sponge needle and lap counted correctly x2.    Indications: Chronic hypertension affecting pregnancy  Pregnancy complicated by:   Patient Active Problem List   Diagnosis    Headache    Chronic hypertension affecting pregnancy    Pelvic pain in female    Polyhydramnios in third trimester     Admitting GA: 37w3d    Delivery Information for Shane Perez    Birth information:  YOB: 2027   Time of birth: 8:28 AM   Sex: male   Head Delivery Date/Time: 2025  8:28 AM   Delivery type: Vaginal, Spontaneous   Gestational Age: 37w3d       Delivery Providers    Delivering clinician: Marley Bridges MD   Provider Role    Danisha Britton RN Registered Nurse    Latonya Nam RN Registered Nurse    Nelly Garcias ST Surgical Tech              Measurements    Weight:   Length:          Apgars    Living status: Living  Apgar Component Scores:  1 min.:  5 min.:  10 min.:  15 min.:  20 min.:    Skin color:         Heart rate:         Reflex irritability:         Muscle tone:         Respiratory effort:         Total:         Apgars assigned by: LATONYA NAM RN         Operative Delivery    Forceps attempted?: No  Vacuum extractor attempted?: No         Shoulder Dystocia    Shoulder dystocia present?: No           Presentation    Presentation: Vertex  Position: Right Occiput Posterior           Interventions/Resuscitation    Method: Bulb Suctioning, Tactile Stimulation       Cord    Vessels: 3 vessels  Complications: None  Delayed Cord Clamping?: No  Cord Clamped Date/Time: 2025  8:29  AM  Cord Blood Disposition: Lab  Gases Sent?: No  Stem Cell Collection (by MD): No       Placenta    Placenta delivery date/time: 2025 0831  Placenta removal: Spontaneous  Placenta appearance: Intact  Placenta disposition: Donation           Labor Events:       labor: No     Labor Onset Date/Time: 2025 05:58     Dilation Complete Date/Time: 2025 08:19     Start Pushing Date/Time: 2025 08:19       Start Pushing Date/Time: 2025 08:19     Rupture Date/Time: 25 0812        Rupture type: ARM (Artificial Rupture)        Fluid Amount:       Fluid Color: Clear              steroids: None     Antibiotics given for GBS: No     Induction: misoprostol     Indications for induction:  Elective     Augmentation: oxytocin     Indications for augmentation: Ineffective Contraction Pattern     Labor complications: None     Additional complications:          Cervical ripening:                     Delivery:      Episiotomy: None     Indication for Episiotomy:       Perineal Lacerations: None Repaired:      Periurethral Laceration:   Repaired:     Labial Laceration:   Repaired:     Sulcus Laceration:   Repaired:     Vaginal Laceration:   Repaired:     Cervical Laceration:   Repaired:     Repair suture: None     Repair # of packets:       Last Value - EBL - Nursing (mL):       Sum - EBL - Nursing (mL): 0     Last Value - EBL - Anesthesia (mL):      Calculated QBL (mL): 130     Running total QBL (mL): 130     Vaginal Sweep Performed: Yes     Surgicount Correct: Yes     Vaginal Packing: No Quantity:       Other providers:       Anesthesia    Method: Epidural          Details (if applicable):  Trial of Labor      Categorization:      Priority:     Indications for :     Incision Type:       Additional  information:  Forceps:    Vacuum:    Breech:    Observed anomalies    Other (Comments):

## 2025-01-14 NOTE — ANESTHESIA POSTPROCEDURE EVALUATION
Anesthesia Post Evaluation    Patient: Brenda Perez    Procedure(s) Performed: * No procedures listed *    Final Anesthesia Type: epidural      Patient location during evaluation: labor & delivery  Patient participation: Yes- Able to Participate  Level of consciousness: awake and alert and oriented  Post-procedure vital signs: reviewed and stable  Pain management: adequate  Airway patency: patent    PONV status at discharge: No PONV  Anesthetic complications: no      Cardiovascular status: blood pressure returned to baseline  Respiratory status: unassisted, spontaneous ventilation and room air  Hydration status: euvolemic  Follow-up not needed.              Vitals Value Taken Time   /59 01/14/25 0847   Temp 36.2 °C (97.2 °F) 01/14/25 0756   Pulse 83 01/14/25 0847   Resp 18 01/14/25 0718   SpO2 100 % 01/14/25 0847   Vitals shown include unfiled device data.      No case tracking events are documented in the log.      Pain/Yudelka Score: No data recorded

## 2025-01-14 NOTE — HPI
Pt is a  @ 37 3 WGA who presented for induction secondary to CHTN and polyhydramnios. Normal FM.  No complaints.

## 2025-01-14 NOTE — SUBJECTIVE & OBJECTIVE
Obstetric HPI:  Patient reports contractions, active fetal movement, No vaginal bleeding , No loss of fluid     This pregnancy has been complicated by CHTN, polyhydramnios, RNI.    OB History    Para Term  AB Living   3 2 2 0 0 2   SAB IAB Ectopic Multiple Live Births   0 0 0 0 2      # Outcome Date GA Lbr Mac/2nd Weight Sex Type Anes PTL Lv   3 Current            2 Term 10/31/22 37w0d / 00:07 3.062 kg (6 lb 12 oz) M Vag-Spont EPI N ALMITA      Name: URBANO CHIRINOS      Apgar1: 9  Apgar5: 9   1 Term 19 37w1d / 01:25 3.16 kg (6 lb 15.5 oz) M Vag-Spont EPI N ALMITA      Name: URBANO CHIRINOS      Apgar1: 9  Apgar5: 9     Past Medical History:   Diagnosis Date    COVID-2021    Hypertension     WITH PREGNANCY- MONITORING    Pregnancy      (vaginal delivery)    Seizures     childhood, grew out of them, per pt     Past Surgical History:   Procedure Laterality Date    DIAGNOSTIC LAPAROSCOPY N/A 10/26/2023    Procedure: LAPAROSCOPY, DIAGNOSTIC;  Surgeon: Rui Miller Jr., MD;  Location: [facility] OR;  Service: OB/GYN;  Laterality: N/A;  To follow 1st case per Dr. Grant    FULGURATION OF ENDOMETRIOSIS  10/26/2023    Procedure: FULGURATION, ENDOMETRIOSIS;  Surgeon: Rui Miller Jr., MD;  Location: [facility] OR;  Service: OB/GYN;;       PTA Medications   Medication Sig    aspirin 81 MG Chew Take 1 tablet (81 mg total) by mouth once daily.    NIFEdipine (PROCARDIA-XL) 60 MG (OSM) 24 hr tablet     ondansetron (ZOFRAN-ODT) 4 MG TbDL Take 4 mg by mouth. (Patient not taking: Reported on 2025)    prenatal vit/iron fum/folic ac (PRENATAL 1+1 ORAL) Take by mouth.       Review of patient's allergies indicates:  No Known Allergies     Family History       Problem Relation (Age of Onset)    Colon cancer Maternal Grandfather    No Known Problems Father, Mother, Sister          Tobacco Use    Smoking status: Never    Smokeless tobacco: Never   Substance and Sexual Activity    Alcohol use: Yes     Comment:  occasional- not during pregnancy    Drug use: Never    Sexual activity: Yes     Partners: Male     Birth control/protection: None     Comment: single     Review of Systems   Constitutional:  Negative for activity change, appetite change, chills, diaphoresis, fatigue and fever.   Eyes:  Negative for visual disturbance.   Respiratory:  Negative for cough, shortness of breath and wheezing.    Cardiovascular:  Negative for chest pain and palpitations.   Gastrointestinal:  Negative for abdominal pain, constipation, diarrhea, nausea and vomiting.   Genitourinary:  Negative for dysuria, genital sores, pelvic pain, urgency, vaginal bleeding, vaginal discharge, vaginal pain and vaginal odor.   Musculoskeletal:  Negative for back pain, joint swelling and myalgias.   Integumentary:  Negative for rash.   Neurological:  Negative for seizures, syncope, numbness and headaches.   Hematological:  Negative for adenopathy. Does not bruise/bleed easily.   Psychiatric/Behavioral:  Negative for depression. The patient is not nervous/anxious.       Objective:     Vital Signs (Most Recent):  Temp: 97.2 °F (36.2 °C) (01/14/25 0756)  Pulse: 95 (01/14/25 0814)  Resp: 18 (01/14/25 0718)  BP: (!) 158/83 (01/14/25 0814)  SpO2: 100 % (01/14/25 0807) Vital Signs (24h Range):  Temp:  [97.2 °F (36.2 °C)-97.5 °F (36.4 °C)] 97.2 °F (36.2 °C)  Pulse:  [] 95  Resp:  [18-20] 18  SpO2:  [98 %-100 %] 100 %  BP: ()/(50-86) 158/83     Weight: 90.3 kg (199 lb)  Body mass index is 32.12 kg/m².    FHT: 120 Cat 1 (reassuring)  TOCO:  Q 1-2 minutes     Physical Exam:   Constitutional: She is oriented to person, place, and time. She appears well-developed and well-nourished. No distress.    HENT:   Head: Normocephalic and atraumatic.    Eyes: Conjunctivae and EOM are normal.      Pulmonary/Chest: Effort normal. No respiratory distress.                  Musculoskeletal: Normal range of motion.       Neurological: She is alert and oriented to person,  place, and time.    Skin: Skin is warm and dry.    Psychiatric: She has a normal mood and affect. Her behavior is normal. Judgment and thought content normal.        Cervix:  Dilation:  1 --> 8  Effacement:  50%  Station: -3  Presentation: Vertex     Significant Labs:  Lab Results   Component Value Date    GROUPTRH B POS 01/13/2025    HEPBSAG Negative 06/03/2024    RUBELLAIGGSC IgG<0.90 06/03/2024       I have personallly reviewed all pertinent lab results from the last 24 hours.  Recent Lab Results         01/13/25 2125        Albumin 3.0              ALT 11       Anion Gap 11       AST 13       Baso # 0.03       Basophil % 0.3       BILIRUBIN TOTAL 0.3  Comment: For infants and newborns, interpretation of results should be based  on gestational age, weight and in agreement with clinical  observations.    Premature Infant recommended reference ranges:  Up to 24 hours.............<8.0 mg/dL  Up to 48 hours............<12.0 mg/dL  3-5 days..................<15.0 mg/dL  6-29 days.................<15.0 mg/dL         BUN 5       Calcium 8.8       Chloride 108       CO2 18       Creatinine 0.5       Differential Method Automated       eGFR >60       Eos # 0.5       Eos % 4.7       Glucose 102       Gran # (ANC) 6.8       Gran % 64.3       Group & Rh B POS       Hematocrit 31.6       Hemoglobin 10.6       Immature Grans (Abs) 0.05  Comment: Mild elevation in immature granulocytes is non specific and   can be seen in a variety of conditions including stress response,   acute inflammation, trauma and pregnancy. Correlation with other   laboratory and clinical findings is essential.         Immature Granulocytes 0.5       INDIRECT RENALDO NEG       Lymph # 2.5       Lymph % 23.6       MCH 26.2       MCHC 33.5       MCV 78       Mono # 0.7       Mono % 6.6       MPV 10.2       nRBC 0       Platelet Count 205       Potassium 3.6       PROTEIN TOTAL 6.5       RBC 4.04       RDW 13.5       Sodium 137       Treponema  Pallidum Antibodies (IgG, IgM) Nonreactive       WBC 10.53

## 2025-01-14 NOTE — HOSPITAL COURSE
Pt presented for cytotec induction followed by pitocin.  Epidural for pain control.  AROM with clear/copious fluid. Delivered by .   PPD#1 Routine pp care.  Meeting all discharge criteria.

## 2025-01-14 NOTE — ANESTHESIA PREPROCEDURE EVALUATION
01/14/2025  Brenda Perez is a 19 y.o., female.      Pre-op Assessment    I have reviewed the Patient Summary Reports.     I have reviewed the Nursing Notes. I have reviewed the NPO Status.      Review of Systems  Anesthesia Hx:  No problems with previous Anesthesia             Denies Family Hx of Anesthesia complications.    Denies Personal Hx of Anesthesia complications.                    Hematology/Oncology:  Hematology Normal   Oncology Normal                                   EENT/Dental:  EENT/Dental Normal           Cardiovascular:     Hypertension                                          Pulmonary:  Pulmonary Normal                       Renal/:  Renal/ Normal                 Hepatic/GI:  Hepatic/GI Normal                    Musculoskeletal:  Musculoskeletal Normal                Neurological:      Headaches Seizures    Has not had a Sx since childhood and is not on any Sx medications and was discharged from her neurologist.                             Endocrine:  Endocrine Normal            Dermatological:  Skin Normal    Psych:  Psychiatric Normal                    Physical Exam  General: Cooperative and Alert    Airway:  Mallampati: II   Mouth Opening: Normal  TM Distance: Normal  Tongue: Normal  Neck ROM: Normal ROM    Chest/Lungs:  Clear to auscultation, Normal Respiratory Rate    Heart:  Rate: Normal  Rhythm: Regular Rhythm  Sounds: Normal        Anesthesia Plan  Type of Anesthesia, risks & benefits discussed:    Anesthesia Type: Gen ETT  Intra-op Monitoring Plan: Standard ASA Monitors  Post Op Pain Control Plan: multimodal analgesia and IV/PO Opioids PRN  Induction:  IV  Airway Plan: Direct  Informed Consent: Informed consent signed with the Patient and all parties understand the risks and agree with anesthesia plan.  All questions answered.   ASA Score: 2  Day of Surgery Review of  History & Physical: H&P Update referred to the surgeon/provider.I have interviewed and examined the patient. I have reviewed the patient's H&P dated: 1/14/25. There are no significant changes.     Ready For Surgery From Anesthesia Perspective.     .

## 2025-01-14 NOTE — H&P
[facility] - Labor & Delivery  Obstetrics  History & Physical    Patient Name: Brenda Chirinos  MRN: [id number]  Admission Date: 2025  Primary Care Provider: Dennys Feng MD    Subjective:     Principal Problem:Chronic hypertension affecting pregnancy    History of Present Illness:  Pt is a  @ 37 3/7 WGA who presented for induction secondary to CHTN and polyhydramnios. Normal FM.  No complaints.     Obstetric HPI:  Patient reports contractions, active fetal movement, No vaginal bleeding , No loss of fluid     This pregnancy has been complicated by CHTN, polyhydramnios, RNI.    OB History    Para Term  AB Living   3 2 2 0 0 2   SAB IAB Ectopic Multiple Live Births   0 0 0 0 2      # Outcome Date GA Lbr Mac/2nd Weight Sex Type Anes PTL Lv   3 Current            2 Term 10/31/22 37w0d / 00:07 3.062 kg (6 lb 12 oz) M Vag-Spont EPI N ALMITA      Name: URBANO CHIRINOS      Apgar1: 9  Apgar5: 9   1 Term 19 37w1d / 01:25 3.16 kg (6 lb 15.5 oz) M Vag-Spont EPI N ALMITA      Name: URBANO CHIRINOS      Apgar1: 9  Apgar5: 9     Past Medical History:   Diagnosis Date    COVID-2021    Hypertension     WITH PREGNANCY- MONITORING    Pregnancy      (vaginal delivery)    Seizures     childhood, grew out of them, per pt     Past Surgical History:   Procedure Laterality Date    DIAGNOSTIC LAPAROSCOPY N/A 10/26/2023    Procedure: LAPAROSCOPY, DIAGNOSTIC;  Surgeon: Rui Miller Jr., MD;  Location: [facility] OR;  Service: OB/GYN;  Laterality: N/A;  To follow 1st case per Dr. Grant    FULGURATION OF ENDOMETRIOSIS  10/26/2023    Procedure: FULGURATION, ENDOMETRIOSIS;  Surgeon: Rui Miller Jr., MD;  Location: [facility] OR;  Service: OB/GYN;;       PTA Medications   Medication Sig    aspirin 81 MG Chew Take 1 tablet (81 mg total) by mouth once daily.    NIFEdipine (PROCARDIA-XL) 60 MG (OSM) 24 hr tablet     ondansetron (ZOFRAN-ODT) 4 MG TbDL Take 4 mg by mouth. (Patient not taking: Reported on  1/7/2025)    prenatal vit/iron fum/folic ac (PRENATAL 1+1 ORAL) Take by mouth.       Review of patient's allergies indicates:  No Known Allergies     Family History       Problem Relation (Age of Onset)    Colon cancer Maternal Grandfather    No Known Problems Father, Mother, Sister          Tobacco Use    Smoking status: Never    Smokeless tobacco: Never   Substance and Sexual Activity    Alcohol use: Yes     Comment: occasional- not during pregnancy    Drug use: Never    Sexual activity: Yes     Partners: Male     Birth control/protection: None     Comment: single     Review of Systems   Constitutional:  Negative for activity change, appetite change, chills, diaphoresis, fatigue and fever.   Eyes:  Negative for visual disturbance.   Respiratory:  Negative for cough, shortness of breath and wheezing.    Cardiovascular:  Negative for chest pain and palpitations.   Gastrointestinal:  Negative for abdominal pain, constipation, diarrhea, nausea and vomiting.   Genitourinary:  Negative for dysuria, genital sores, pelvic pain, urgency, vaginal bleeding, vaginal discharge, vaginal pain and vaginal odor.   Musculoskeletal:  Negative for back pain, joint swelling and myalgias.   Integumentary:  Negative for rash.   Neurological:  Negative for seizures, syncope, numbness and headaches.   Hematological:  Negative for adenopathy. Does not bruise/bleed easily.   Psychiatric/Behavioral:  Negative for depression. The patient is not nervous/anxious.       Objective:     Vital Signs (Most Recent):  Temp: 97.2 °F (36.2 °C) (01/14/25 0756)  Pulse: 95 (01/14/25 0814)  Resp: 18 (01/14/25 0718)  BP: (!) 158/83 (01/14/25 0814)  SpO2: 100 % (01/14/25 0807) Vital Signs (24h Range):  Temp:  [97.2 °F (36.2 °C)-97.5 °F (36.4 °C)] 97.2 °F (36.2 °C)  Pulse:  [] 95  Resp:  [18-20] 18  SpO2:  [98 %-100 %] 100 %  BP: ()/(50-86) 158/83     Weight: 90.3 kg (199 lb)  Body mass index is 32.12 kg/m².    FHT: 120 Cat 1 (reassuring)  TOCO:  Q  1-2 minutes     Physical Exam:   Constitutional: She is oriented to person, place, and time. She appears well-developed and well-nourished. No distress.    HENT:   Head: Normocephalic and atraumatic.    Eyes: Conjunctivae and EOM are normal.      Pulmonary/Chest: Effort normal. No respiratory distress.                  Musculoskeletal: Normal range of motion.       Neurological: She is alert and oriented to person, place, and time.    Skin: Skin is warm and dry.    Psychiatric: She has a normal mood and affect. Her behavior is normal. Judgment and thought content normal.        Cervix:  Dilation:  1 --> 8  Effacement:  50%  Station: -3  Presentation: Vertex     Significant Labs:  Lab Results   Component Value Date    GROUPTRH B POS 01/13/2025    HEPBSAG Negative 06/03/2024    RUBELLAIGGSC IgG<0.90 06/03/2024       I have personallly reviewed all pertinent lab results from the last 24 hours.  Recent Lab Results         01/13/25 2125        Albumin 3.0              ALT 11       Anion Gap 11       AST 13       Baso # 0.03       Basophil % 0.3       BILIRUBIN TOTAL 0.3  Comment: For infants and newborns, interpretation of results should be based  on gestational age, weight and in agreement with clinical  observations.    Premature Infant recommended reference ranges:  Up to 24 hours.............<8.0 mg/dL  Up to 48 hours............<12.0 mg/dL  3-5 days..................<15.0 mg/dL  6-29 days.................<15.0 mg/dL         BUN 5       Calcium 8.8       Chloride 108       CO2 18       Creatinine 0.5       Differential Method Automated       eGFR >60       Eos # 0.5       Eos % 4.7       Glucose 102       Gran # (ANC) 6.8       Gran % 64.3       Group & Rh B POS       Hematocrit 31.6       Hemoglobin 10.6       Immature Grans (Abs) 0.05  Comment: Mild elevation in immature granulocytes is non specific and   can be seen in a variety of conditions including stress response,   acute inflammation, trauma and  pregnancy. Correlation with other   laboratory and clinical findings is essential.         Immature Granulocytes 0.5       INDIRECT RENALDO NEG       Lymph # 2.5       Lymph % 23.6       MCH 26.2       MCHC 33.5       MCV 78       Mono # 0.7       Mono % 6.6       MPV 10.2       nRBC 0       Platelet Count 205       Potassium 3.6       PROTEIN TOTAL 6.5       RBC 4.04       RDW 13.5       Sodium 137       Treponema Pallidum Antibodies (IgG, IgM) Nonreactive       WBC 10.53             Assessment/Plan:     19 y.o. female  at 37w3d for:    * Chronic hypertension affecting pregnancy  Admit for induction of labor.  Cytotec followed by pitocin.     Patient cleared for Anesthesia: Epidural    Anesthesia/Surgery risks, benefits, and alternative options discussed and understood by patient/fa      Polyhydramnios in third trimester  Noted, fluid clear with AROM.        Marley Bridges MD  Obstetrics  [facility] - Labor & Delivery

## 2025-01-14 NOTE — NURSING
Fundal check done per RN at 0934. Pads noted to be saturated. Fundus was firm without massage but multiple clots expressed. TXA and cytotec given. See MAR. Updated MD.

## 2025-01-14 NOTE — ANESTHESIA PROCEDURE NOTES
Epidural    Patient location during procedure: OB   Reason for block: primary anesthetic   Reason for block: labor analgesia requested by patient and obstetrician  Diagnosis: pregnancy   Start time: 1/14/2025 7:39 AM  Timeout: 1/14/2025 7:38 AM  End time: 1/14/2025 7:41 AM  Surgery related to: pregnancy    Staffing  Performing Provider: Manuel Day CRNA  Authorizing Provider: Manuel Day CRNA    Staffing  Performed by: Manuel Day, MARIAM  Authorized by: Manuel Day CRNA        Preanesthetic Checklist  Completed: patient identified, IV checked, site marked, risks and benefits discussed, surgical consent, monitors and equipment checked, pre-op evaluation, timeout performed, anesthesia consent given, hand hygiene performed and patient being monitored  Preparation  Patient position: sitting  Prep: ChloraPrep  Patient monitoring: Pulse Ox and Blood Pressure  Reason for block: primary anesthetic   Epidural  Skin Anesthetic: lidocaine 1%  Skin Wheal: 3 mL  Administration type: single shot  Approach: midline  Interspace: L2-3    Injection technique: TARYN saline  Needle and Epidural Catheter  Needle type: Tuohy   Needle gauge: 18  Needle length: 3.5 inches  Needle insertion depth: 7 cm  Catheter type: multi-orifice  Catheter size: 20 G  Catheter at skin depth: 14 cm  Insertion Attempts: 1  Test dose: 5 mL of lidocaine 1.5% with Epi 1-to-200,000  Additional Documentation: incremental injection, negative aspiration for heme and CSF, no paresthesia on injection, no signs/symptoms of IV or SA injection, no significant complaints from patient and no significant pain on injection  Needle localization: anatomical landmarks  Assessment  Upper dermatomal levels - Left: T10  Right: T10   Dermatomal levels determined by pinch or prick  Ease of block: easy  Patient's tolerance of the procedure: comfortable throughout block and no complaints No inadvertent dural puncture with Tuohy.  Dural puncture performed with spinal  needle.

## 2025-01-14 NOTE — ASSESSMENT & PLAN NOTE
Admit for induction of labor.  Cytotec followed by pitocin.     Patient cleared for Anesthesia: Epidural    Anesthesia/Surgery risks, benefits, and alternative options discussed and understood by patient/fa

## 2025-01-15 VITALS
HEART RATE: 73 BPM | BODY MASS INDEX: 31.98 KG/M2 | HEIGHT: 66 IN | SYSTOLIC BLOOD PRESSURE: 108 MMHG | TEMPERATURE: 97 F | WEIGHT: 199 LBS | RESPIRATION RATE: 18 BRPM | OXYGEN SATURATION: 99 % | DIASTOLIC BLOOD PRESSURE: 71 MMHG

## 2025-01-15 PROCEDURE — 99238 HOSP IP/OBS DSCHRG MGMT 30/<: CPT | Mod: ,,, | Performed by: OBSTETRICS & GYNECOLOGY

## 2025-01-15 PROCEDURE — 25000003 PHARM REV CODE 250: Performed by: OBSTETRICS & GYNECOLOGY

## 2025-01-15 RX ORDER — IBUPROFEN 600 MG/1
600 TABLET ORAL EVERY 6 HOURS PRN
Qty: 30 TABLET | Refills: 0 | Status: SHIPPED | OUTPATIENT
Start: 2025-01-15 | End: 2025-01-25

## 2025-01-15 RX ADMIN — IBUPROFEN 600 MG: 600 TABLET ORAL at 10:01

## 2025-01-15 RX ADMIN — PRENATAL VIT W/ FE FUMARATE-FA TAB 27-0.8 MG 1 TABLET: 27-0.8 TAB at 08:01

## 2025-01-15 RX ADMIN — NIFEDIPINE 60 MG: 30 TABLET, FILM COATED, EXTENDED RELEASE ORAL at 08:01

## 2025-01-15 NOTE — DISCHARGE INSTRUCTIONS
Postpartum Discharge Instructions:    No heavy lifting, straining, frequent rest periods  Pelvic rest--no douching, tampons, or intercourse until released by MD  Talk to your doctor about birth control--remember breastfeeding is not a method of birth control  Notify MD if bleeding becomes heavier than usual and if large clots, painful cramping,or foul odor develops.   Vaginal discharge will lighten and decrease in amount gradually.    Cleanse perineal area from front to back after urination or having a bowel movement.  Tub soak or portable sitz bath at home.  Apply clean pad with Epifoam and Tucks to perineal area.  Episiotomy stitches will dissolve within 2-3 weeks  If not breastfeeding, wear tight fitting sports bra for 1 week--remove only to bathe  Remember to keep your breast clean and dry to prevent any cracking  Notify MD if breast become reddened,swollen, nipples bleed or crack, or fever greater than 100.4  Notify MD of pain, swelling,  Redness, or heat developing in back of leg especially when foot is flexed toward body  Look at incision everyday for redness, swelling, or drainage which may indicate infection  Notify MD of pain not relieved by pain medication.  Call MD or go to ER for any concerns    [email or web address]

## 2025-01-15 NOTE — NURSING
Discharge instructions reviewed with patient and significant other. Instructed pt to follow up with Dr. Bridges in 2 weeks and to call office for appointment. Patient verbalized understanding of all discharge instructions and denies any questions or concerns at this time. Patient stable and to be wheeled out to home

## 2025-01-15 NOTE — PLAN OF CARE
[facility] - Labor & Delivery  Discharge Assessment    Primary Care Provider: Dennys Feng MD     OB Screen (most recent)       OB Screen - 01/15/25 0854          OB SCREEN    Assessment Type Discharge Planning Brief Assessment (P)      Source of Information health record (P)      Received Prenatal Care Yes, Late (P)      Any indications/suspicions for None (P)      Is this a teen pregnancy No (P)      Is the baby in NICU No (P)      Indication for adoption/Safe Haven No (P)      Indication for DME/post-acute needs No (P)      HIV (+) No (P)      Any congenital  disorders No (P)      Fetal demise/ death No (P)                        This patient has been screened for Case Management needs.  Based on documentation in medical record, no needs are anticipated at this time. Case Management/Social Work remains available if a need arises, please enter consult for assistance.  For urgent needs contact Case Management Department/on-call at:  [phone].

## 2025-01-15 NOTE — NURSING
"11:12 am Patient complaining of passing a blood clot in parking lot as she was getting into vehicle. Patient brought back to [facility] for assessment. Patient denies passing clots "bigger than the size of a giovanna", or heavy bleeding since delivery.     Blood clot weighed 104g. Fundus firm and at 2 below, scant bleeding. Dr. Bridges notified. Patient asked to stay on unit to be assessed in 30 mins per Dr. Bridges. Patient agreed.     11:55 am Patient reassessed. Fundus remains firm, no other clots passed. Bleeding remains scant. MD notified. Precautions reviewed, patient verbalized understanding. Patient walked off unit with nurse.         "

## 2025-01-15 NOTE — DISCHARGE SUMMARY
[facility] - Labor & Delivery  Obstetrics  Discharge Summary      Patient Name: Brenda Perez  MRN: [id number]  Admission Date: 2025  Hospital Length of Stay: 2 days  Discharge Date and Time:  01/15/2025 9:12 AM  Attending Physician: Marley Bridges MD   Discharging Provider: Marley Bridges MD   Primary Care Provider: Dennys Feng MD    HPI: Pt is a  @ 37 3/7 WGA who presented for induction secondary to CHTN and polyhydramnios. Normal FM.  No complaints.         * No surgery found *     Hospital Course:   Pt presented for cytotec induction followed by pitocin.  Epidural for pain control.  AROM with clear/copious fluid. Delivered by .   PPD#1 Routine pp care.  Meeting all discharge criteria.          Final Active Diagnoses:    Diagnosis Date Noted POA    PRINCIPAL PROBLEM:   (normal spontaneous vaginal delivery) [O80] 01/15/2025 Not Applicable    Polyhydramnios in third trimester [O40.3XX0] 2025 Yes    Chronic hypertension affecting pregnancy [O10.919] 2022 Yes      Problems Resolved During this Admission:        Significant Diagnostic Studies: N/A      Feeding Method: bottle    Immunizations       Date Immunization Status Dose Route/Site Given by    25 0959 MMR Incomplete 0.5 mL Subcutaneous/     25 0959 Tdap Incomplete 0.5 mL Intramuscular/             Delivery:    Episiotomy: None   Lacerations: None   Repair suture: None   Repair # of packets:     Blood loss (ml):       Birth information:  YOB: 2027   Time of birth: 8:28 AM   Sex: male   Delivery type: Vaginal, Spontaneous   Gestational Age: 37w3d     Measurements    Weight: 3147 g  Weight (lbs): 6 lb 15 oz  Length:          Delivery Clinician: Delivery Providers    Delivering clinician: Marley Bridges MD   Provider Role    Danisha Britton RN Registered Nurse    Latonya Nam RN Registered Nurse    Nelly Garcias ST Surgical Tech             Additional  information:  Forceps:     Vacuum:    Breech:    Observed anomalies      Living?:     Apgars    Living status: Living  Apgar Component Scores:  1 min.:  5 min.:  10 min.:  15 min.:  20 min.:    Skin color:  0  1       Heart rate:  2  2       Reflex irritability:  2  2       Muscle tone:  2  2       Respiratory effort:  2  2       Total:  8  9       Apgars assigned by: CARMEN HOOD RN         Placenta: Delivered:       appearance  Pending Diagnostic Studies:       None            Discharged Condition: good    Disposition: Home or Self Care    Follow Up:   Follow-up Information       Marley Bridges MD Follow up in 2 week(s).    Specialty: Obstetrics and Gynecology  Contact information:  [place] [id number]  [phone]                           Patient Instructions:      Diet Adult Regular     Lifting restrictions   Order Comments: No lifting greater than 15 pounds     Pelvic Rest     Notify your health care provider if you experience any of the following:  temperature >100.4     Notify your health care provider if you experience any of the following:  persistent nausea and vomiting or diarrhea     Notify your health care provider if you experience any of the following:  severe uncontrolled pain     Notify your health care provider if you experience any of the following:  redness, tenderness, or signs of infection (pain, swelling, redness, odor or green/yellow discharge around incision site)     Notify your health care provider if you experience any of the following:  difficulty breathing or increased cough     Notify your health care provider if you experience any of the following:  severe persistent headache     Notify your health care provider if you experience any of the following:  worsening rash     Notify your health care provider if you experience any of the following:  persistent dizziness, light-headedness, or visual disturbances     Notify your health care provider if you experience any of the following:   increased confusion or weakness     Notify your health care provider if you experience any of the following:   Order Comments: Heavy vaginal bleeding (saturating 2 pads in 1 hour)     Activity as tolerated     Medications:  Current Discharge Medication List        START taking these medications    Details   ibuprofen (ADVIL,MOTRIN) 600 MG tablet Take 1 tablet (600 mg total) by mouth every 6 (six) hours as needed (cramping).  Qty: 30 tablet, Refills: 0           CONTINUE these medications which have NOT CHANGED    Details   NIFEdipine (PROCARDIA-XL) 60 MG (OSM) 24 hr tablet       ondansetron (ZOFRAN-ODT) 4 MG TbDL Take 4 mg by mouth.      prenatal vit/iron fum/folic ac (PRENATAL 1+1 ORAL) Take by mouth.           STOP taking these medications       aspirin 81 MG Chew Comments:   Reason for Stopping:               Marley Bridges MD  Obstetrics  [facility] - Labor & Delivery

## 2025-01-15 NOTE — NURSING
Patient stable, vital signs remain WNL. Patient preforming self mel-care. Fundus firm, at umbilicus with light vaginal bleeding. Voiding spontaneously. Ambulates around room well and independently. Abdominal cramping and pain well controlled with prescribed oral mediations. Questions/ Concerns answered. Mother verbalizes understanding.

## 2025-01-17 NOTE — PLAN OF CARE
[facility] - Labor & Delivery  Discharge Final Note    Primary Care Provider: Dennys Feng MD    Expected Discharge Date: 1/15/2025    Final Discharge Note (most recent)       Final Note - 01/17/25 1224          Final Note    Assessment Type Final Discharge Note (P)      Anticipated Discharge Disposition Home or Self Care (P)      Hospital Resources/Appts/Education Provided Provided education on problems/symptoms using teachback;Provided patient/caregiver with written discharge plan information (P)         Post-Acute Status    Discharge Delays None known at this time (P)                      Important Message from Medicare             Contact Info       Marley Bridges MD   Specialty: Obstetrics and Gynecology   Relationship: Consulting Physician    [place] [id number]   Phone: [phone]       Next Steps: Follow up in 2 week(s)

## 2025-01-25 ENCOUNTER — POSTPARTUM VISIT (OUTPATIENT)
Dept: OBSTETRICS AND GYNECOLOGY | Facility: CLINIC | Age: 20
End: 2025-01-25
Payer: MEDICAID

## 2025-01-25 VITALS
HEIGHT: 66 IN | DIASTOLIC BLOOD PRESSURE: 82 MMHG | SYSTOLIC BLOOD PRESSURE: 120 MMHG | HEART RATE: 76 BPM | WEIGHT: 181.19 LBS | BODY MASS INDEX: 29.12 KG/M2

## 2025-01-25 DIAGNOSIS — Z13.32 ENCOUNTER FOR SCREENING FOR MATERNAL DEPRESSION: ICD-10-CM

## 2025-01-25 PROCEDURE — 99999 PR PBB SHADOW E&M-EST. PATIENT-LVL II: CPT | Mod: PBBFAC,,, | Performed by: OBSTETRICS & GYNECOLOGY

## 2025-01-25 PROCEDURE — 99212 OFFICE O/P EST SF 10 MIN: CPT | Mod: PBBFAC | Performed by: OBSTETRICS & GYNECOLOGY

## 2025-01-25 NOTE — PROGRESS NOTES
CC: Post-partum follow-up    Brenda Perez is a 19 y.o. female  presents for a postpartum visit.  She is status post   1 weeks ago.  Her hospitalization was complicated.  She is not breastfeeding.  She desires oral contraceptives (estrogen/progesterone) for contraception.  She denies postpartum depression. [place] depression scale score 1. She and the baby are doing well.  No pain.  No fever.   No bowel / bladder complaints.     Pregnancy complicated by HTN; she has stopped Procardia bc sates that BP only elevated during pregnancy.     Her last pap was 2024      ROS:  GENERAL: No fever, chills, fatigability.  VULVAR: No pain, no lesions and no itching.  VAGINAL: No relaxation, no itching, no discharge, no abnormal bleeding and no lesions.  ABDOMEN: No abdominal pain. Denies nausea. Denies vomiting. No diarrhea. No constipation  BREAST: Denies pain. No lumps. No discharge.  URINARY: No incontinence, no nocturia, no frequency and no dysuria.  CARDIOVASCULAR: No chest pain. No shortness of breath. No leg cramps.  NEUROLOGICAL: No headaches. No vision changes.    Past Medical History:   Diagnosis Date    COVID-2021    Hypertension     WITH PREGNANCY- MONITORING    Pregnancy      (vaginal delivery)    Seizures     childhood, grew out of them, per pt     Past Surgical History:   Procedure Laterality Date    DIAGNOSTIC LAPAROSCOPY N/A 10/26/2023    Procedure: LAPAROSCOPY, DIAGNOSTIC;  Surgeon: Rui Miller Jr., MD;  Location: [facility] OR;  Service: OB/GYN;  Laterality: N/A;  To follow 1st case per Dr. Grant    FULGURATION OF ENDOMETRIOSIS  10/26/2023    Procedure: FULGURATION, ENDOMETRIOSIS;  Surgeon: Rui Miller Jr., MD;  Location: [facility] OR;  Service: OB/GYN;;     Review of patient's allergies indicates:  No Known Allergies    Current Outpatient Medications:     NIFEdipine (PROCARDIA-XL) 60 MG (OSM) 24 hr tablet, , Disp: , Rfl:     prenatal vit/iron fum/folic ac (PRENATAL 1+1  ORAL), Take by mouth., Disp: , Rfl:       Vitals:    25 1046   BP: 120/82   Pulse: 76       Physical Exam  Vitals reviewed.   Constitutional:       General: She is not in acute distress.     Appearance: She is well-developed.   HENT:      Head: Normocephalic and atraumatic.   Eyes:      Conjunctiva/sclera: Conjunctivae normal.   Pulmonary:      Effort: Pulmonary effort is normal.   Musculoskeletal:         General: No tenderness.      Cervical back: Normal range of motion and neck supple.      Right lower leg: No edema.      Left lower leg: No edema.   Skin:     General: Skin is warm and dry.   Neurological:      Mental Status: She is alert and oriented to person, place, and time.   Psychiatric:         Behavior: Behavior normal.         Thought Content: Thought content normal.         Judgment: Judgment normal.           Assessment:    1. Normal postpartum exam  2. Doing well S/P   3. PP depression screening       Plan:    1. Routine follow up.  2. Instructions / precautions reviewed  3. Contraceptive counseling  4. Return: in 4 weeks

## 2025-02-27 ENCOUNTER — POSTPARTUM VISIT (OUTPATIENT)
Dept: OBSTETRICS AND GYNECOLOGY | Facility: CLINIC | Age: 20
End: 2025-02-27
Payer: MEDICAID

## 2025-02-27 VITALS
BODY MASS INDEX: 29.83 KG/M2 | HEART RATE: 87 BPM | WEIGHT: 185.63 LBS | HEIGHT: 66 IN | DIASTOLIC BLOOD PRESSURE: 84 MMHG | SYSTOLIC BLOOD PRESSURE: 120 MMHG

## 2025-02-27 DIAGNOSIS — Z13.32 ENCOUNTER FOR SCREENING FOR MATERNAL DEPRESSION: ICD-10-CM

## 2025-02-27 DIAGNOSIS — Z30.011 ENCOUNTER FOR INITIAL PRESCRIPTION OF CONTRACEPTIVE PILLS: ICD-10-CM

## 2025-02-27 PROCEDURE — 99999 PR PBB SHADOW E&M-EST. PATIENT-LVL III: CPT | Mod: PBBFAC,,, | Performed by: OBSTETRICS & GYNECOLOGY

## 2025-02-27 PROCEDURE — 99213 OFFICE O/P EST LOW 20 MIN: CPT | Mod: PBBFAC | Performed by: OBSTETRICS & GYNECOLOGY

## 2025-02-27 RX ORDER — SERTRALINE HYDROCHLORIDE 25 MG/1
25 TABLET, FILM COATED ORAL DAILY
Qty: 30 TABLET | Refills: 2 | Status: SHIPPED | OUTPATIENT
Start: 2025-02-27 | End: 2026-02-27

## 2025-02-27 RX ORDER — NORETHINDRONE ACETATE AND ETHINYL ESTRADIOL 1.5-30(21)
1 KIT ORAL DAILY
Qty: 30 TABLET | Refills: 11 | Status: SHIPPED | OUTPATIENT
Start: 2025-02-27 | End: 2026-02-27

## 2025-02-27 NOTE — PROGRESS NOTES
CC: Post-partum follow-up    Brenda Perez is a 19 y.o. female  presents for a postpartum visit.  She is status post   6 weeks ago.  Her hospitalization was not complicated.  She is not breastfeeding.  She desires oral contraceptives (estrogen/progesterone) for contraception.  She reports postpartum depression. Sultan depression scale score 2.  Would like to start a medication now. She and the baby are doing well.  No pain.  No fever.   No bowel / bladder complaints.    Her last pap was 2024      ROS:  GENERAL: No fever, chills, fatigability.  VULVAR: No pain, no lesions and no itching.  VAGINAL: No relaxation, no itching, no discharge, no abnormal bleeding and no lesions.  ABDOMEN: No abdominal pain. Denies nausea. Denies vomiting. No diarrhea. No constipation  BREAST: Denies pain. No lumps. No discharge.  URINARY: No incontinence, no nocturia, no frequency and no dysuria.  CARDIOVASCULAR: No chest pain. No shortness of breath. No leg cramps.  NEUROLOGICAL: No headaches. No vision changes.    Past Medical History:   Diagnosis Date    COVID-2021    Hypertension     WITH PREGNANCY- MONITORING    Pregnancy      (vaginal delivery)    Seizures     childhood, grew out of them, per pt     Past Surgical History:   Procedure Laterality Date    DIAGNOSTIC LAPAROSCOPY N/A 10/26/2023    Procedure: LAPAROSCOPY, DIAGNOSTIC;  Surgeon: Rui Miller Jr., MD;  Location: [facility] OR;  Service: OB/GYN;  Laterality: N/A;  To follow 1st case per Dr. Grant    FULGURATION OF ENDOMETRIOSIS  10/26/2023    Procedure: FULGURATION, ENDOMETRIOSIS;  Surgeon: Rui Miller Jr., MD;  Location: [facility] OR;  Service: OB/GYN;;     Review of patient's allergies indicates:  No Known Allergies  Current Medications[1]      Vitals:    25 1009   BP: 120/84   Pulse: 87         EXTERNAL GENITALIA POSTPARTUM: normal, well-healed, without lesions or masses, VAGINA POSTPARTUM: normal, well-healed, physiologic  discharge, without lesions, CERVIX POSTPARTUM: normal, well-healed, without lesions, UTERUS POSTPARTUM: normal size, well involuted, firm, non-tender, ADNEXA POSTPARTUM: no masses palpable and nontender    Assessment:    1. Normal postpartum exam  2. Doing well S/P   3. PP depression screening - concern for pp depression      Plan:    1. Routine follow up.  2. Instructions / precautions reviewed  3. Contraceptive counseling  4. May resume normal activities  5. Return: for annual exam or prn.         [1]   Current Outpatient Medications:     norethindrone-ethinyl estradiol-iron (JUNEL FE 1.5/30, ,) 1.5 mg-30 mcg (21)/75 mg (7) tablet, Take 1 tablet by mouth once daily., Disp: 30 tablet, Rfl: 11    sertraline (ZOLOFT) 25 MG tablet, Take 1 tablet (25 mg total) by mouth once daily., Disp: 30 tablet, Rfl: 2

## 2025-03-10 ENCOUNTER — E-VISIT (OUTPATIENT)
Dept: OBSTETRICS AND GYNECOLOGY | Facility: CLINIC | Age: 20
End: 2025-03-10
Payer: MEDICAID

## 2025-03-10 ENCOUNTER — TELEPHONE (OUTPATIENT)
Dept: OBSTETRICS AND GYNECOLOGY | Facility: CLINIC | Age: 20
End: 2025-03-10

## 2025-03-10 DIAGNOSIS — R10.2 VAGINAL PAIN: Primary | ICD-10-CM

## 2025-03-10 PROCEDURE — 99499 UNLISTED E&M SERVICE: CPT | Mod: ,,, | Performed by: OBSTETRICS & GYNECOLOGY

## 2025-03-10 NOTE — TELEPHONE ENCOUNTER
Pt was called and informed an in-person visit is needed for her symptoms. Pt has complaints of intermittent sharp vaginal pains and lower pelvic pains. LMP was 2/28/25 and pt started OCPs on 3/2/25 per pt. Offered pt multiple appts and pt either is not able to come see Dr. Bridges at the time offered or pt does not want to see another provider. Pt requested appt for next week and appt was scheduled. Instructed pt to report to ER if symptoms worsen and she voiced understanding.

## 2025-03-18 ENCOUNTER — E-VISIT (OUTPATIENT)
Dept: OBSTETRICS AND GYNECOLOGY | Facility: CLINIC | Age: 20
End: 2025-03-18
Payer: MEDICAID

## 2025-03-19 RX ORDER — VENLAFAXINE HYDROCHLORIDE 75 MG/1
75 CAPSULE, EXTENDED RELEASE ORAL DAILY
Qty: 30 CAPSULE | Refills: 2 | Status: SHIPPED | OUTPATIENT
Start: 2025-03-19 | End: 2026-03-19

## 2025-03-19 NOTE — PROGRESS NOTES
Patient ID: Brenda Perez is a 19 y.o. female.    Chief Complaint: General Illness (Entered automatically based on patient selection in [employer].)    The patient initiated a request through [employer] on 3/18/2025 for evaluation and management with a chief complaint of General Illness (Entered automatically based on patient selection in [employer].)     I evaluated the questionnaire submission on 3/18/25.    [facility]    3/18/2025  9:55 PM CDT - Filed by Patient   What do you need help with? Medication Management   Do you agree to participate in an E-Visit? Yes   If you have any of the following symptoms, please present to your local emergency room or call 911:  I acknowledge   Medication requests for narcotics will not be addressed via an E-Visit.  Please schedule an appointment. I acknowledge   Do you have any of the following pregnancy-related conditions? None   Do you want to address a new or existing medication? I would like to address a medication I currently take   What is the main issue you would like addressed today? Im having some bqr side effects wirh my depression medicine , such as tangling & numbness in my hands and sometimes my face, serious headaches & bad tireness   Would you like to change or continue your medication? Change medication   What medication would you like changed?  Sertraline   What is your current dose? 25 mg   How often do you take your medication? 1 time daily   Why do you need the medication changed? Side effects   List the side effects you had with the medication: Extreme tireness , numbness / tingling in face and hands , headache , stomach pains   Have you stopped taking the medicine? No   Are you still having side effects? Yes   Do you need treatment for your side effects? No    What medical condition is the  medication intended to treat? Depression   Are you able to take your vital signs? No         Encounter Diagnosis   Name Primary?    Postpartum  depression Yes        No orders of the defined types were placed in this encounter.     Medications Ordered This Encounter   Medications    venlafaxine (EFFEXOR XR) 75 MG 24 hr capsule     Sig: Take 1 capsule (75 mg total) by mouth once daily.     Dispense:  30 capsule     Refill:  2        No follow-ups on file.      E-Visit Time Tracking:    Day 1 Time (in minutes): 6    Total Time (in minutes): 6

## 2025-03-31 ENCOUNTER — OFFICE VISIT (OUTPATIENT)
Dept: URGENT CARE | Facility: CLINIC | Age: 20
End: 2025-03-31
Payer: MEDICAID

## 2025-03-31 VITALS
OXYGEN SATURATION: 99 % | TEMPERATURE: 98 F | RESPIRATION RATE: 18 BRPM | DIASTOLIC BLOOD PRESSURE: 84 MMHG | HEIGHT: 66 IN | HEART RATE: 105 BPM | WEIGHT: 185 LBS | SYSTOLIC BLOOD PRESSURE: 132 MMHG | BODY MASS INDEX: 29.73 KG/M2

## 2025-03-31 DIAGNOSIS — L55.9 SUNBURN: Primary | ICD-10-CM

## 2025-03-31 DIAGNOSIS — R52 ACUTE PAIN: ICD-10-CM

## 2025-03-31 PROCEDURE — 99213 OFFICE O/P EST LOW 20 MIN: CPT | Mod: S$GLB,,, | Performed by: NURSE PRACTITIONER

## 2025-03-31 RX ORDER — SILVER SULFADIAZINE 10 G/1000G
CREAM TOPICAL 2 TIMES DAILY
Qty: 50 G | Refills: 0 | Status: SHIPPED | OUTPATIENT
Start: 2025-03-31 | End: 2025-04-01

## 2025-03-31 RX ORDER — KETOROLAC TROMETHAMINE 30 MG/ML
30 INJECTION, SOLUTION INTRAMUSCULAR; INTRAVENOUS
Status: COMPLETED | OUTPATIENT
Start: 2025-03-31 | End: 2025-03-31

## 2025-03-31 RX ADMIN — KETOROLAC TROMETHAMINE 30 MG: 30 INJECTION, SOLUTION INTRAMUSCULAR; INTRAVENOUS at 08:03

## 2025-03-31 NOTE — LETTER
March 31, 2025      [facility]  [address], [address]  [place] [address]  Phone: [phone]  Fax: [phone]       Patient: Brenda Perez   YOB: 2005  Date of Visit: 03/31/2025    To Whom It May Concern:    Candis Perez  was at [facility] on 03/31/2025. The patient may return to work/school on 4/2/25 with no restrictions. If you have any questions or concerns, or if I can be of further assistance, please do not hesitate to contact me.    Sincerely,    Courtney R Schladweiler, MA

## 2025-03-31 NOTE — PROGRESS NOTES
"Subjective:      Patient ID: Brenda Perez is a 19 y.o. female.    Vitals:  height is 5' 6" (1.676 m) and weight is 83.9 kg (185 lb). Her oral temperature is 97.9 °F (36.6 °C). Her blood pressure is 132/84 and her pulse is 105. Her respiration is 18 and oxygen saturation is 99%.     Chief Complaint: Sunburn    18 y/o female presents to clinic with sunburn and arm cramping since yesterday. Patient was in the sun for 4 hours yesterday in the boat and state that she can't be in the sun for more than an hour with the anti-depressant she is on. There were little blisters on the L arm but she believes they popped in the shower. Has remains of blisters on  both arms.  Both arms are cramping to the point where it is hard for her to move them. PT states she can't take Ibuprofen with the medication she is taking according to [employer] but is unsure.     Sunburn  This is a new problem. The current episode started yesterday. The problem has been rapidly worsening. Associated symptoms include vomiting. Pertinent negatives include no fatigue, fever, headaches, nausea, numbness or weakness. Treatments tried: Aloe Vera and OTC spray. The treatment provided no relief.       Constitution: Negative for fatigue and fever.   Gastrointestinal:  Positive for vomiting. Negative for nausea.   Neurological:  Negative for headaches and numbness.      Objective:     Physical Exam   Constitutional: She is oriented to person, place, and time.  Non-toxic appearance. No distress.   HENT:   Head: Atraumatic.   Ears:   Right Ear: External ear normal.   Left Ear: External ear normal.   Mouth/Throat: Mucous membranes are moist.   Eyes: Conjunctivae are normal. No scleral icterus.   Neck: Neck supple.   Cardiovascular: Normal rate.   Pulmonary/Chest: Effort normal.   Neurological: She is alert and oriented to person, place, and time.   Skin: Skin is warm, dry and not diaphoretic. burn (sunburn to arms and chest)        Psychiatric: Her behavior is " normal. Mood, judgment and thought content normal.       Assessment:     1. Sunburn    2. Acute pain        Plan:       Sunburn  -     silver sulfADIAZINE 1% (SILVADENE) 1 % cream; Apply topically 2 (two) times daily.  Dispense: 50 g; Refill: 0    Acute pain  -     ketorolac injection 30 mg

## 2025-03-31 NOTE — PATIENT INSTRUCTIONS
Elevated Blood Pressure  Your blood pressure was elevated during your visit to the urgent care today.  It is recommended that you monitor your blood pressure today and over the next week or two to make sure that it is not staying elevated.  Please have your blood pressure taken 2-3 times daily at different times of the day.  Keep a log of these blood pressure readings and take it with you to see your Primary Care Physician.  If your blood pressure is consistently above 140/90, you should follow-up with your PCP without delay.       1.  Take all medications as directed.   2.  Rest and keep yourself/patient well hydrated. For adults, it is recommended to drink at least 8-10 glasses of water daily.   3. You should schedule a follow-up appointment with your Primary Care Provider for recheck in 4-5 days or as directed at this visit.   4.  If your condition fails to improve in a timely manner, you should receive another evaluation by your Primary Care Provider to discuss your concerns or return to urgent care for a recheck.     If your condition worsens at any time, you should report immediately to your nearest Emergency Department for further evaluation. **You must understand that you have received Urgent Care treatment only and that you may be released before all of your medical problems are known or treated. You, the patient, are responsible to arrange for follow-up care as instructed.

## 2025-04-01 ENCOUNTER — TELEPHONE (OUTPATIENT)
Dept: URGENT CARE | Facility: CLINIC | Age: 20
End: 2025-04-01
Payer: MEDICAID

## 2025-04-01 RX ORDER — SILVER SULFADIAZINE 10 G/1000G
CREAM TOPICAL 2 TIMES DAILY
Qty: 85 G | Refills: 0 | Status: SHIPPED | OUTPATIENT
Start: 2025-04-01

## 2025-04-01 NOTE — TELEPHONE ENCOUNTER
Pt called stating she seen Denice godinez on yesterday 03/31/2025 and she sent in a 50 g tube of silvadene and it is not enough.  Cynthia servin addended Denice godinez note and sent in a bigger tube.          ----- Message from Edita sent at 4/1/2025  8:12 AM CDT -----  Regarding: RX  Type:  Needs Medical AdviceWho Called: Dusty Perez (please be specific): Blisters How long has patient had these symptoms:  03/31/2025Pharmacy name and phone #:  Same on file Would the patient rather a call back or a response via [facility]? the patient rather a call back Best Call Back Number: [phone] Information: Pt. States almost out of Rx for blisters, requesting a refill.Thanks!

## 2025-04-01 NOTE — ADDENDUM NOTE
Addended by: MAYELA RODRIGUEZ on: 4/1/2025 09:49 AM     Modules accepted: Orders    
Additional Progress Note...

## 2025-04-20 ENCOUNTER — PATIENT MESSAGE (OUTPATIENT)
Dept: OBSTETRICS AND GYNECOLOGY | Facility: CLINIC | Age: 20
End: 2025-04-20
Payer: MEDICAID

## 2025-05-02 ENCOUNTER — OFFICE VISIT (OUTPATIENT)
Dept: OBSTETRICS AND GYNECOLOGY | Facility: CLINIC | Age: 20
End: 2025-05-02
Payer: MEDICAID

## 2025-05-02 VITALS
BODY MASS INDEX: 31.69 KG/M2 | HEART RATE: 90 BPM | SYSTOLIC BLOOD PRESSURE: 126 MMHG | DIASTOLIC BLOOD PRESSURE: 84 MMHG | HEIGHT: 66 IN | WEIGHT: 197.19 LBS

## 2025-05-02 DIAGNOSIS — R10.2 PELVIC PAIN IN FEMALE: Primary | ICD-10-CM

## 2025-05-02 DIAGNOSIS — N76.0 ACUTE VAGINITIS: ICD-10-CM

## 2025-05-02 PROCEDURE — 99213 OFFICE O/P EST LOW 20 MIN: CPT | Mod: PBBFAC | Performed by: OBSTETRICS & GYNECOLOGY

## 2025-05-02 PROCEDURE — 99999 PR PBB SHADOW E&M-EST. PATIENT-LVL III: CPT | Mod: PBBFAC,,, | Performed by: OBSTETRICS & GYNECOLOGY

## 2025-05-02 RX ORDER — ACETAMINOPHEN 325 MG/1
325 TABLET ORAL EVERY 6 HOURS PRN
COMMUNITY

## 2025-05-02 RX ORDER — DOXYCYCLINE 100 MG/1
100 CAPSULE ORAL 2 TIMES DAILY
Qty: 14 CAPSULE | Refills: 0 | Status: SHIPPED | OUTPATIENT
Start: 2025-05-02 | End: 2025-05-09

## 2025-05-02 RX ORDER — METRONIDAZOLE 500 MG/1
500 TABLET ORAL EVERY 12 HOURS
Qty: 14 TABLET | Refills: 0 | Status: SHIPPED | OUTPATIENT
Start: 2025-05-02 | End: 2025-05-09

## 2025-05-02 NOTE — PROGRESS NOTES
Subjective:       Patient ID: Brenda Perez is a 19 y.o. female.    Chief Complaint:  Pelvic Pain (Starts mid pelvis and radiates to the side, more pain with a full bladder.), Vaginal Bleeding (Spotting off and on), and Menstrual Problem      History of Present Illness  Pelvic Pain  The patient's primary symptoms include pelvic pain. The patient's pertinent negatives include no vaginal discharge. Pertinent negatives include no abdominal pain, back pain, chills, constipation, diarrhea, dysuria, fever, flank pain, frequency, headaches, hematuria, nausea, rash, sore throat, urgency or vomiting.   Vaginal Bleeding  The patient's primary symptoms include pelvic pain. The patient's pertinent negatives include no vaginal discharge. Pertinent negatives include no abdominal pain, back pain, chills, constipation, diarrhea, dysuria, fever, flank pain, frequency, headaches, hematuria, nausea, rash, sore throat, urgency or vomiting.     Abdominal Pain  Patient presents for evaluation of abdominal pain. The pain is described as cramping and stabbing. Pain is located in the suprapubic area without radiation. Onset was sudden occurring 1 month ago. Symptoms have been gradually worsening since. Aggravating factors: a full bladder. Alleviating factors: none.   No dysuria.  Constipation that she feels is secondary to the pain.   She also reports breakthrough bleeding for the past month with her OCP. Not bleeding currently.       GYN & OB History  No LMP recorded (lmp unknown).   Date of Last Pap: 2024    OB History    Para Term  AB Living   3 3 3   3   SAB IAB Ectopic Multiple Live Births      0 3      # Outcome Date GA Lbr Mac/2nd Weight Sex Type Anes PTL Lv   3 Term 25 37w3d 02:21 / 00:09 3.147 kg (6 lb 15 oz) M Vag-Spont EPI N ALMITA   2 Term 10/31/22 37w0d / 00:07 3.062 kg (6 lb 12 oz) M Vag-Spont EPI N ALMITA   1 Term 21 37w1d / 01:25 3.16 kg (6 lb 15.5 oz) M Vag-Spont EPI N ALMITA       Review of  Systems  Review of Systems   Constitutional:  Negative for chills, diaphoresis, fatigue, fever and unexpected weight change.   HENT:  Negative for congestion, hearing loss, postnasal drip, rhinorrhea, sinus pressure, sinus pain, sore throat and tinnitus.    Eyes:  Negative for pain, discharge and visual disturbance.   Respiratory:  Negative for apnea, cough, shortness of breath and wheezing.    Cardiovascular:  Negative for chest pain, palpitations and leg swelling.   Gastrointestinal:  Negative for abdominal pain, constipation, diarrhea, nausea and vomiting.   Endocrine: Negative for cold intolerance, heat intolerance, polydipsia, polyphagia and polyuria.   Genitourinary:  Positive for menstrual problem, pelvic pain and vaginal bleeding. Negative for difficulty urinating, dyspareunia, dysuria, enuresis, flank pain, frequency, genital sores, hematuria, urgency, vaginal discharge and vaginal pain.   Musculoskeletal:  Negative for arthralgias, back pain, joint swelling, myalgias, neck pain and neck stiffness.   Skin:  Negative for color change, pallor and rash.   Allergic/Immunologic: Negative for environmental allergies, food allergies and immunocompromised state.   Neurological:  Negative for dizziness, weakness, light-headedness, numbness and headaches.   Hematological:  Negative for adenopathy. Does not bruise/bleed easily.   Psychiatric/Behavioral:  Negative for agitation and confusion. The patient is not nervous/anxious.            Objective:    Physical Exam:   Constitutional: She is oriented to person, place, and time. She appears well-developed and well-nourished. No distress.    HENT:   Head: Normocephalic and atraumatic.    Eyes: Conjunctivae and EOM are normal.      Pulmonary/Chest: Effort normal. No respiratory distress.          Genitourinary: The external female genitalia was normal.   Right adnexum displays tenderness. Left adnexum displays tenderness. There is vaginal discharge in the vagina. No  bleeding in the vagina. Cervix exhibits tenderness. Uterus is tender. Uterus is not enlarged and not fixed. Urethra findings: no tendernessBladder findings: no bladder tenderness          Musculoskeletal: Normal range of motion.       Neurological: She is alert and oriented to person, place, and time.    Skin: Skin is warm and dry.    Psychiatric: She has a normal mood and affect. Her behavior is normal. Judgment and thought content normal.          Assessment:        1. Pelvic pain in female    2. Acute vaginitis               Plan:      Brenda was seen today for pelvic pain, vaginal bleeding and menstrual problem.    Diagnoses and all orders for this visit:    Pelvic pain in female  -     doxycycline (VIBRAMYCIN) 100 MG Cap; Take 1 capsule (100 mg total) by mouth 2 (two) times daily. for 7 days  -     metroNIDAZOLE (FLAGYL) 500 MG tablet; Take 1 tablet (500 mg total) by mouth every 12 (twelve) hours. for 7 days  -     US Pelvis Comp with Transvag NON-OB (xpd; Future  -     Vaginosis Screen by DNA Probe    Acute vaginitis  -     doxycycline (VIBRAMYCIN) 100 MG Cap; Take 1 capsule (100 mg total) by mouth 2 (two) times daily. for 7 days  -     metroNIDAZOLE (FLAGYL) 500 MG tablet; Take 1 tablet (500 mg total) by mouth every 12 (twelve) hours. for 7 days  -     Vaginosis Screen by DNA Probe

## 2025-05-05 ENCOUNTER — TELEPHONE (OUTPATIENT)
Dept: OBSTETRICS AND GYNECOLOGY | Facility: CLINIC | Age: 20
End: 2025-05-05
Payer: MEDICAID

## 2025-05-05 ENCOUNTER — RESULTS FOLLOW-UP (OUTPATIENT)
Dept: OBSTETRICS AND GYNECOLOGY | Facility: CLINIC | Age: 20
End: 2025-05-05

## 2025-05-05 NOTE — TELEPHONE ENCOUNTER
----- Message from Med Assistant Mcgrath sent at 5/5/2025 10:04 AM CDT -----  Contact: self  Brenda BaezN: [id number] Phone      [phone] Phone      Not on file.Mobile          [phone] Care Team:Dennys Feng MD as PCP - General (Family Medicine)Rui Miller Jr., MD as Consulting Physician (Obstetrics and Gynecology)Marley Bridges MD as Consulting Physician (Obstetrics and Gynecology)Yamile King MD as Consulting Physician (Obstetrics and Gynecology) OB? NoWhat phone number can you be reached at? [phone]: Has questions regarding ultrasound orders.

## 2025-05-07 ENCOUNTER — RESULTS FOLLOW-UP (OUTPATIENT)
Dept: OBSTETRICS AND GYNECOLOGY | Facility: CLINIC | Age: 20
End: 2025-05-07
Payer: MEDICAID

## 2025-05-21 ENCOUNTER — PATIENT MESSAGE (OUTPATIENT)
Dept: OBSTETRICS AND GYNECOLOGY | Facility: CLINIC | Age: 20
End: 2025-05-21
Payer: MEDICAID

## 2025-05-21 DIAGNOSIS — Z30.011 ENCOUNTER FOR INITIAL PRESCRIPTION OF CONTRACEPTIVE PILLS: Primary | ICD-10-CM

## 2025-05-21 DIAGNOSIS — I10 HYPERTENSION, UNSPECIFIED TYPE: ICD-10-CM

## 2025-05-22 RX ORDER — DROSPIRENONE 4 MG/1
4 TABLET, FILM COATED ORAL DAILY
Qty: 28 TABLET | Refills: 10 | Status: SHIPPED | OUTPATIENT
Start: 2025-05-22

## 2025-06-08 ENCOUNTER — PATIENT MESSAGE (OUTPATIENT)
Dept: OBSTETRICS AND GYNECOLOGY | Facility: CLINIC | Age: 20
End: 2025-06-08
Payer: MEDICAID

## 2025-06-08 DIAGNOSIS — G89.29 CHRONIC PELVIC PAIN IN FEMALE: Primary | ICD-10-CM

## 2025-06-08 DIAGNOSIS — R10.2 CHRONIC PELVIC PAIN IN FEMALE: Primary | ICD-10-CM

## 2025-06-09 NOTE — TELEPHONE ENCOUNTER
Please review message and advise. Pt was seen for pelvic pain on 5/2/25 and ultrasound was done the same day. Pt had normal US and vaginosis screening.  Pt sent portal message about being concerned that she has adenomyosis and said it feels similar to before she has surgery to remove her endometriosis.

## 2025-06-12 NOTE — TELEPHONE ENCOUNTER
Diagnostic laparoscope scheduled for 6/25/2025 with pre-op, pre-admit and post op appointments scheduled and discussed with patient.  Instructed to arrive through main entrance of [facility] at 7a day of surgery.  Pre-admit nurse will call day before if there is a time change. Will need someone to drive pt to and from hospital.  Pt denies cardiac history, heart attack, stroke, stents, aneurysms, blood clots or valve replacements. Medication list reviewed with patient.  Case request number [id number].  Eunice in surgery department notified of date and time.   Instructed on the following:    WEEK BEFORE SURGERY:  STOP ALL NSAIDS (ibuprofen, Aleve, Aspirin, etc) 7 days prior to your surgery.  NOTIFY your doctor if you are on any of the following medications listed below:  Dulaglutide (Trulicity)  Liraglutide (Victoza)  Semaglutide (Ozempic, Wegovy, Rybelsus)  Exenatide (Byetta, Bydureon)  Lixisenatide (Adlyxin)  Tirzepatide (Mounjaro)  Phentermine (Adipex)  These medications should be stopped 7 days prior to scheduled surgery. Patient verbalized understanding.

## 2025-06-18 ENCOUNTER — OFFICE VISIT (OUTPATIENT)
Dept: OBSTETRICS AND GYNECOLOGY | Facility: CLINIC | Age: 20
End: 2025-06-18
Payer: MEDICAID

## 2025-06-18 VITALS
HEART RATE: 74 BPM | WEIGHT: 197 LBS | SYSTOLIC BLOOD PRESSURE: 132 MMHG | DIASTOLIC BLOOD PRESSURE: 84 MMHG | BODY MASS INDEX: 31.66 KG/M2 | HEIGHT: 66 IN

## 2025-06-18 DIAGNOSIS — G89.29 CHRONIC FEMALE PELVIC PAIN: ICD-10-CM

## 2025-06-18 DIAGNOSIS — N80.9 ENDOMETRIOSIS: Primary | ICD-10-CM

## 2025-06-18 DIAGNOSIS — R10.2 CHRONIC FEMALE PELVIC PAIN: ICD-10-CM

## 2025-06-18 PROCEDURE — 99213 OFFICE O/P EST LOW 20 MIN: CPT | Mod: PBBFAC | Performed by: OBSTETRICS & GYNECOLOGY

## 2025-06-18 PROCEDURE — 99499 UNLISTED E&M SERVICE: CPT | Mod: S$PBB,,, | Performed by: OBSTETRICS & GYNECOLOGY

## 2025-06-18 PROCEDURE — 99999 PR PBB SHADOW E&M-EST. PATIENT-LVL III: CPT | Mod: PBBFAC,,, | Performed by: OBSTETRICS & GYNECOLOGY

## 2025-06-18 RX ORDER — FAMOTIDINE 20 MG/1
20 TABLET, FILM COATED ORAL
OUTPATIENT
Start: 2025-06-18

## 2025-06-18 RX ORDER — OLOPATADINE HYDROCHLORIDE 1 MG/ML
1 SOLUTION OPHTHALMIC 2 TIMES DAILY PRN
COMMUNITY
Start: 2025-05-21 | End: 2025-06-18

## 2025-06-18 RX ORDER — NIFEDIPINE 60 MG/1
60 TABLET, EXTENDED RELEASE ORAL DAILY
COMMUNITY
Start: 2025-06-05

## 2025-06-18 RX ORDER — LEVOCETIRIZINE DIHYDROCHLORIDE 5 MG/1
5 TABLET, FILM COATED ORAL DAILY PRN
COMMUNITY
Start: 2025-05-20

## 2025-06-18 RX ORDER — SODIUM CHLORIDE 9 MG/ML
INJECTION, SOLUTION INTRAVENOUS CONTINUOUS
OUTPATIENT
Start: 2025-06-18

## 2025-06-18 RX ORDER — AZELASTINE HYDROCHLORIDE, FLUTICASONE PROPIONATE 137; 50 UG/1; UG/1
1 SPRAY, METERED NASAL 2 TIMES DAILY PRN
COMMUNITY
Start: 2025-05-21

## 2025-06-18 RX ORDER — NORETHINDRONE ACETATE AND ETHINYL ESTRADIOL AND FERROUS FUMARATE 1.5-30(21)
1 KIT ORAL DAILY
COMMUNITY
Start: 2025-06-15 | End: 2025-06-18

## 2025-06-18 NOTE — H&P
Pre-Operative History and Physical   Obstetrics and Gynecology    [facility] Elvira Perez is a 20 y.o. female  for preop examination.  She is scheduled for a diagnostic laparoscopy on 25.  Patient has known h/o endometriosis with h/o prior laparoscopy and treatment in the past.  She has been having worsening pelvic pain not improving with medical management.  Ultrasounds have been negative.    The pain is described as cramping and stabbing. Pain is located in the suprapubic area without radiation. Onset was sudden occurring 2 months ago. Symptoms have been gradually worsening since. Aggravating factors: a full bladder. Alleviating factors: none.   No dysuria.  Constipation that she feels is secondary to the pain.   She now is also having worsening pressure in the deep pelvis.   She also reports breakthrough bleeding for the past month with her OCP. Not bleeding currently.      ROS:  GENERAL: Denies weight gain or weight loss. Feeling well overall.   SKIN: Denies rash or lesions.   HEAD: Denies head injury or headache.   NODES: Denies enlarged lymph nodes.   CHEST: Denies chest pain or shortness of breath.   CARDIOVASCULAR: Denies palpitations or left sided chest pain.   ABDOMEN: No abdominal pain, constipation, diarrhea, nausea, vomiting or rectal bleeding.   URINARY: No frequency, dysuria, hematuria, or burning on urination.  REPRODUCTIVE: See HPI.   BREASTS: The patient performs breast self-examination and denies pain, lumps, or nipple discharge.   HEMATOLOGIC: No easy bruisability or excessive bleeding with the exception of menstrual cycles.  MUSCULOSKELETAL: Denies joint pain or swelling.   NEUROLOGIC: Denies syncope or weakness.   PSYCHIATRIC: Denies depression, anxiety or mood swings.    Past Medical History:   Diagnosis Date    COVID-19     Hypertension     WITH PREGNANCY- MONITORING    Pregnancy      (vaginal delivery)    Seizures     childhood, grew out of them, per pt     Past Surgical  History:   Procedure Laterality Date    DIAGNOSTIC LAPAROSCOPY N/A 10/26/2023    Procedure: LAPAROSCOPY, DIAGNOSTIC;  Surgeon: Rui Miller Jr., MD;  Location: [facility] OR;  Service: OB/GYN;  Laterality: N/A;  To follow 1st case per Dr. Grant    FULGURATION OF ENDOMETRIOSIS  10/26/2023    Procedure: FULGURATION, ENDOMETRIOSIS;  Surgeon: Rui Miller Jr., MD;  Location: [facility] OR;  Service: OB/GYN;;     Family History   Problem Relation Name Age of Onset    Colon cancer Maternal Grandfather      No Known Problems Father      No Known Problems Mother      No Known Problems Sister      Breast cancer Neg Hx      Ovarian cancer Neg Hx       Review of patient's allergies indicates:  No Known Allergies  Current Medications[1]  Outpatient Medications Marked as Taking for the 6/18/25 encounter (Office Visit) with Marley Bridges MD   Medication Sig Dispense Refill    azelastine-fluticasone (DYMISTA) 137-50 mcg/spray Spry nassal spray 1 spray by Each Nostril route 2 (two) times daily as needed.      levocetirizine (XYZAL) 5 MG tablet Take 5 mg by mouth daily as needed.      NIFEdipine (ADALAT CC) 60 MG TbSR Take 60 mg by mouth once daily.      [DISCONTINUED] JUNEL FE 1.5/30, 28, 1.5 mg-30 mcg (21)/75 mg (7) tablet Take 1 tablet by mouth once daily.      [DISCONTINUED] olopatadine (PATANOL) 0.1 % ophthalmic solution Place 1 drop into both eyes 2 (two) times daily as needed.      [DISCONTINUED] venlafaxine (EFFEXOR XR) 75 MG 24 hr capsule Take 1 capsule (75 mg total) by mouth once daily. (Patient taking differently: Take 75 mg by mouth nightly.) 30 capsule 2     Social History[2]      Last pap 2024 NILM    Last ultrasound 5/25  FINDINGS:  The uterus measures 8.8 x 5.0 x 6.4 cm.  Uterine echotexture is heterogeneous.  No uterine mass identified.  Endometrial stripe measures 0.7 cm in thickness.  The right ovary measures 2.4 x 1.6 x 2.3 cm and demonstrates no abnormality.  Left ovary measures spine 2.9 x 1.8 x 2.7  cm and demonstrates no abnormality.  No adnexal mass or obvious free pelvic fluid.     Impression:     No unexpected sonographic finding of the uterus or ovaries.    Vitals:    06/18/25 1623   BP: 132/84   Pulse: 74     Physical Exam  Vitals reviewed.   Constitutional:       General: She is not in acute distress.     Appearance: She is well-developed.   HENT:      Head: Normocephalic and atraumatic.   Eyes:      Conjunctiva/sclera: Conjunctivae normal.   Pulmonary:      Effort: Pulmonary effort is normal.   Musculoskeletal:      Cervical back: Normal range of motion and neck supple.   Neurological:      Mental Status: She is alert and oriented to person, place, and time.   Psychiatric:         Behavior: Behavior normal.         Thought Content: Thought content normal.         Judgment: Judgment normal.           Assessment: CPP, endometriosis    Plan: Diagnostic laparoscopy    I have discussed the risks, benefits, indications, and alternatives of the procedure in detail.  The patient verbalizes her understanding.  All questions answered.  Consents signed.  The patient agrees to proceed to proceed as planned.                 [1]   Current Outpatient Medications:     azelastine-fluticasone (DYMISTA) 137-50 mcg/spray Spry nassal spray, 1 spray by Each Nostril route 2 (two) times daily as needed., Disp: , Rfl:     levocetirizine (XYZAL) 5 MG tablet, Take 5 mg by mouth daily as needed., Disp: , Rfl:     NIFEdipine (ADALAT CC) 60 MG TbSR, Take 60 mg by mouth once daily., Disp: , Rfl:     drospirenone, contraceptive, (SLYND) 4 mg (28) Tab, Take 1 tablet (4 mg total) by mouth once daily., Disp: 28 tablet, Rfl: 10  [2]   Social History  Tobacco Use    Smoking status: Never     Passive exposure: Never    Smokeless tobacco: Never   Substance Use Topics    Alcohol use: Yes     Comment: occasional- not during pregnancy    Drug use: Never

## 2025-06-18 NOTE — H&P (VIEW-ONLY)
Pre-Operative History and Physical   Obstetrics and Gynecology    [facility] Elvira Perez is a 20 y.o. female  for preop examination.  She is scheduled for a diagnostic laparoscopy on 25.  Patient has known h/o endometriosis with h/o prior laparoscopy and treatment in the past.  She has been having worsening pelvic pain not improving with medical management.  Ultrasounds have been negative.    The pain is described as cramping and stabbing. Pain is located in the suprapubic area without radiation. Onset was sudden occurring 2 months ago. Symptoms have been gradually worsening since. Aggravating factors: a full bladder. Alleviating factors: none.   No dysuria.  Constipation that she feels is secondary to the pain.   She now is also having worsening pressure in the deep pelvis.   She also reports breakthrough bleeding for the past month with her OCP. Not bleeding currently.      ROS:  GENERAL: Denies weight gain or weight loss. Feeling well overall.   SKIN: Denies rash or lesions.   HEAD: Denies head injury or headache.   NODES: Denies enlarged lymph nodes.   CHEST: Denies chest pain or shortness of breath.   CARDIOVASCULAR: Denies palpitations or left sided chest pain.   ABDOMEN: No abdominal pain, constipation, diarrhea, nausea, vomiting or rectal bleeding.   URINARY: No frequency, dysuria, hematuria, or burning on urination.  REPRODUCTIVE: See HPI.   BREASTS: The patient performs breast self-examination and denies pain, lumps, or nipple discharge.   HEMATOLOGIC: No easy bruisability or excessive bleeding with the exception of menstrual cycles.  MUSCULOSKELETAL: Denies joint pain or swelling.   NEUROLOGIC: Denies syncope or weakness.   PSYCHIATRIC: Denies depression, anxiety or mood swings.    Past Medical History:   Diagnosis Date    COVID-19     Hypertension     WITH PREGNANCY- MONITORING    Pregnancy      (vaginal delivery)    Seizures     childhood, grew out of them, per pt     Past Surgical  History:   Procedure Laterality Date    DIAGNOSTIC LAPAROSCOPY N/A 10/26/2023    Procedure: LAPAROSCOPY, DIAGNOSTIC;  Surgeon: Rui Miller Jr., MD;  Location: [facility] OR;  Service: OB/GYN;  Laterality: N/A;  To follow 1st case per Dr. Grant    FULGURATION OF ENDOMETRIOSIS  10/26/2023    Procedure: FULGURATION, ENDOMETRIOSIS;  Surgeon: Rui Miller Jr., MD;  Location: [facility] OR;  Service: OB/GYN;;     Family History   Problem Relation Name Age of Onset    Colon cancer Maternal Grandfather      No Known Problems Father      No Known Problems Mother      No Known Problems Sister      Breast cancer Neg Hx      Ovarian cancer Neg Hx       Review of patient's allergies indicates:  No Known Allergies  Current Medications[1]  Outpatient Medications Marked as Taking for the 6/18/25 encounter (Office Visit) with Marley Bridges MD   Medication Sig Dispense Refill    azelastine-fluticasone (DYMISTA) 137-50 mcg/spray Spry nassal spray 1 spray by Each Nostril route 2 (two) times daily as needed.      levocetirizine (XYZAL) 5 MG tablet Take 5 mg by mouth daily as needed.      NIFEdipine (ADALAT CC) 60 MG TbSR Take 60 mg by mouth once daily.      [DISCONTINUED] JUNEL FE 1.5/30, 28, 1.5 mg-30 mcg (21)/75 mg (7) tablet Take 1 tablet by mouth once daily.      [DISCONTINUED] olopatadine (PATANOL) 0.1 % ophthalmic solution Place 1 drop into both eyes 2 (two) times daily as needed.      [DISCONTINUED] venlafaxine (EFFEXOR XR) 75 MG 24 hr capsule Take 1 capsule (75 mg total) by mouth once daily. (Patient taking differently: Take 75 mg by mouth nightly.) 30 capsule 2     Social History[2]      Last pap 2024 NILM    Last ultrasound 5/25  FINDINGS:  The uterus measures 8.8 x 5.0 x 6.4 cm.  Uterine echotexture is heterogeneous.  No uterine mass identified.  Endometrial stripe measures 0.7 cm in thickness.  The right ovary measures 2.4 x 1.6 x 2.3 cm and demonstrates no abnormality.  Left ovary measures spine 2.9 x 1.8 x 2.7  cm and demonstrates no abnormality.  No adnexal mass or obvious free pelvic fluid.     Impression:     No unexpected sonographic finding of the uterus or ovaries.    Vitals:    06/18/25 1623   BP: 132/84   Pulse: 74     Physical Exam  Vitals reviewed.   Constitutional:       General: She is not in acute distress.     Appearance: She is well-developed.   HENT:      Head: Normocephalic and atraumatic.   Eyes:      Conjunctiva/sclera: Conjunctivae normal.   Pulmonary:      Effort: Pulmonary effort is normal.   Musculoskeletal:      Cervical back: Normal range of motion and neck supple.   Neurological:      Mental Status: She is alert and oriented to person, place, and time.   Psychiatric:         Behavior: Behavior normal.         Thought Content: Thought content normal.         Judgment: Judgment normal.           Assessment: CPP, endometriosis    Plan: Diagnostic laparoscopy    I have discussed the risks, benefits, indications, and alternatives of the procedure in detail.  The patient verbalizes her understanding.  All questions answered.  Consents signed.  The patient agrees to proceed to proceed as planned.                 [1]   Current Outpatient Medications:     azelastine-fluticasone (DYMISTA) 137-50 mcg/spray Spry nassal spray, 1 spray by Each Nostril route 2 (two) times daily as needed., Disp: , Rfl:     levocetirizine (XYZAL) 5 MG tablet, Take 5 mg by mouth daily as needed., Disp: , Rfl:     NIFEdipine (ADALAT CC) 60 MG TbSR, Take 60 mg by mouth once daily., Disp: , Rfl:     drospirenone, contraceptive, (SLYND) 4 mg (28) Tab, Take 1 tablet (4 mg total) by mouth once daily., Disp: 28 tablet, Rfl: 10  [2]   Social History  Tobacco Use    Smoking status: Never     Passive exposure: Never    Smokeless tobacco: Never   Substance Use Topics    Alcohol use: Yes     Comment: occasional- not during pregnancy    Drug use: Never

## 2025-06-24 ENCOUNTER — HOSPITAL ENCOUNTER (OUTPATIENT)
Dept: PREADMISSION TESTING | Facility: HOSPITAL | Age: 20
Discharge: HOME | End: 2025-06-24
Attending: OBSTETRICS & GYNECOLOGY
Payer: MEDICAID

## 2025-06-24 DIAGNOSIS — Z01.818 PREOP TESTING: Primary | ICD-10-CM

## 2025-06-24 NOTE — DISCHARGE INSTRUCTIONS
[facility]  Pre Admit Instructions    Day and Date of Procedure:      Call your doctor if you become ill, have an infection, or are taking antibiotics before your surgery  Someone will call you between 1 p.m. And 5 p.m. the workday before the procedure to give you an arrival time       - If your procedure is before 7 a.m. enter through Emergency Room door and check in with them       - 7 a.m. to 5 p.m. Enter through main entrance and check in with registration  You must have a responsible  to bring you home  Cafeteria hours for guest: 7am to 10am; 11am to 1:30 pm.      Do NOT eat anything past:   Clear liquids until:  No dairy, creamers, gum or mints the morning of your procedure    Please    Do not wear makeup, jewelry, nail polish or body piercings  Bring containers/solution for contacts, dentures, bridges - these and hearing aids will be removed before your procedure  Do not bring cash, jewelry or valuables the day of your procedure   No smoking at least 24 hours before your procedure  Wear clothing that is comfortable and easy to take off and put on  Do NOT shave for at least 5 days before your surgery    PRE-OP Hibiclens bath Instructions:    Shower with Hibiclens the Night before and morning of the procedure.   Wash your face with your regular cleanser. DO NOT use Hibiclens on your face.  Thoroughly rinse your body with warm water from the neck down  Apply Hibiclens directly to your skin or on a wet washcloth and wash gently. Do not stand under the water while washing with Hibiclens as you will   remove the wash too soon.  Rinse thoroughly with warm water  DO NOT use your regular soap after you have bathed with the Hibiclens  Do not apply lotion or deodorant   Put on a clean pair of clothes after each bath          Information about your stay (Please Review)    Before Surgery  Your doctor may order and review labs, x-rays, ECG or other tests as a pre-surgery workup and will call you if  there is need for follow up.  If you did not get a Covid test before your procedure, one will be done when you arrive. You must have a negative Covid test result before your procedure.  No smoking inside or outside the hospital. You must leave the campus to smoke.  Wear clothing that is easy to take off and put on.  The hospital will provide you with a gown.  You may bring robe, slippers, nightwear, and toiletries (toothbrush, toothpaste, makeup) if needed.  If your doctor orders a Fleets Enema or other prep, follow package and/or doctors orders.  Brush your teeth and rinse your mouth the morning of surgery, but dont swallow the water.  The nurse will ask questions and check your condition.  The doctor may rachele your surgical site.  Compression boots will be placed on your calves to reduce the risk of blood clots.  An IV will be started in pre-procedure area.  The doctor may order medicine to help you relax before surgery.  After Surgery  After you recover in post-procedure area you will go to the medicine floor to recover for a few more hours.  The nurse will check your temperature, breathing, blood pressure, heart rate, IV site, and surgery site.  A diet will be ordered-most start with ice chips and then advance slowly to other foods.  If you have IV fluids the IV pump will beep to let the nurse know that she needs to check it.  You may have a urinary catheter and staff may measure your oral intake and urine output.  Pain medication may be ordered by the doctor after surgery.  If you have a pain management device tell your caretakers not to press the button because of OVERDOSE RISK.  When the nurse or doctor tells you it is okay to get out of bed, ask for help until you are stable.  The nurse may ask you to turn, cough, and deep breathe to prevent lung problems.  You can use a pillow to hold your incision when you deep breathe or cough to reduce pain.  The nurse will give you discharge instructions--incision  care, symptoms to report to your doctor, and your follow-up appointment when you are discharged. You cannot drive yourself home.  Only one person is allowed during your stay due to Covid-19 restrictions. Visiting hours are 8 am to 6 pm.    Goals for Discharge from One Day Surgery  Control pain with an oral medication  Walk without feeling dizzy or weak  Tolerate liquids well  Urinate without difficulty    Things you can do to  Reduce the Risk of Infections or Complications  Wash Hands and use Waterless Hand Sanitizers  Wash hands frequently with soap and warm water for at least 15 seconds.   Use hand sanitizers (alcohol based) often at home and in public if hands are not visibly soiled  Take Antibiotic Exactly as Prescribed  Do not stop antibiotics too soon; you risk developing infection resistant to antibiotics  Take your antibiotic even if you are feeling better and even if they upset your stomach  Call the doctor if you cant tolerate the antibiotic or you have an allergic reaction  Stay Healthy  Take medicines as prescribed by your doctor  Keep your diabetes under control - diet and medication  Get enough rest, exercise and eat a healthy diet  Keep the Wound Clean and Dry  Wash hands before and after taking care of the incision (cut)  Wash hands when you remove a dressing, before you touch/apply a new dressing  Shower and clean incision with antibacterial soap and rinse well if the doctor approves  Allow the cut to dry completely before putting on a clean dressing  Do not touch the part of the bandage that will cover the incision  Do not use ointments unless your doctor tells you to-can promote bacterial growth  If ordered, put ointment directly on the dressing-do not touch the end of the tube  Do not scrub, remove scabs, or leave a damp dressing on the incision  Do not use peroxide or alcohol to clean the incision unless the doctor tells you to   Do not let children, pets or anyone else contaminate the  incision  Stop Smoking To Prevent Infection  Stop smoking-Centers for Disease Control recommends 30 days before surgery  Smokers get more infections after surgery-studies have shown 6 times the risk  Smokers have more scarring and heal slower-open wounds get infected easier  Prevent Respiratory complications  Stop smoking  Turn, cough, and deep breathe even if you have some pain when you do so.  Splint your incision with a pillow when you cough/deep breath, to help control pain.  Do not lie in one position for long periods of time.   Prevent Blood Clots  When you wake move your legs, flex your feet, rotate your ankles, wiggle your toes  Get up when the doctor says its ok.  Dangle your feet from the side of the bed  Report symptoms-leg pain, redness/swelling, warm to touch; fever; shortness of breath, chest pain, severe upper back pain.

## 2025-06-25 ENCOUNTER — HOSPITAL ENCOUNTER (OUTPATIENT)
Facility: HOSPITAL | Age: 20
Discharge: HOME | End: 2025-06-25
Attending: OBSTETRICS & GYNECOLOGY | Admitting: OBSTETRICS & GYNECOLOGY
Payer: MEDICAID

## 2025-06-25 ENCOUNTER — ANESTHESIA EVENT (OUTPATIENT)
Dept: SURGERY | Facility: HOSPITAL | Age: 20
End: 2025-06-25
Payer: MEDICAID

## 2025-06-25 ENCOUNTER — ANESTHESIA (OUTPATIENT)
Dept: SURGERY | Facility: HOSPITAL | Age: 20
End: 2025-06-25
Payer: MEDICAID

## 2025-06-25 VITALS
RESPIRATION RATE: 14 BRPM | TEMPERATURE: 97 F | DIASTOLIC BLOOD PRESSURE: 59 MMHG | SYSTOLIC BLOOD PRESSURE: 102 MMHG | OXYGEN SATURATION: 96 % | HEART RATE: 74 BPM

## 2025-06-25 DIAGNOSIS — G89.29 CHRONIC PELVIC PAIN IN FEMALE: Primary | ICD-10-CM

## 2025-06-25 DIAGNOSIS — N80.9 ENDOMETRIOSIS: ICD-10-CM

## 2025-06-25 DIAGNOSIS — R10.2 CHRONIC FEMALE PELVIC PAIN: ICD-10-CM

## 2025-06-25 DIAGNOSIS — R10.2 CHRONIC PELVIC PAIN IN FEMALE: Primary | ICD-10-CM

## 2025-06-25 DIAGNOSIS — G89.29 CHRONIC FEMALE PELVIC PAIN: ICD-10-CM

## 2025-06-25 PROCEDURE — 58662 LAPAROSCOPY EXCISE LESIONS: CPT | Mod: ,,, | Performed by: OBSTETRICS & GYNECOLOGY

## 2025-06-25 PROCEDURE — 36000708 HC OR TIME LEV III 1ST 15 MIN: Performed by: OBSTETRICS & GYNECOLOGY

## 2025-06-25 PROCEDURE — 71000033 HC RECOVERY, INTIAL HOUR: Performed by: OBSTETRICS & GYNECOLOGY

## 2025-06-25 PROCEDURE — 71000039 HC RECOVERY, EACH ADD'L HOUR: Performed by: OBSTETRICS & GYNECOLOGY

## 2025-06-25 PROCEDURE — 37000009 HC ANESTHESIA EA ADD 15 MINS: Performed by: OBSTETRICS & GYNECOLOGY

## 2025-06-25 PROCEDURE — 63600175 PHARM REV CODE 636 W HCPCS: Performed by: NURSE ANESTHETIST, CERTIFIED REGISTERED

## 2025-06-25 PROCEDURE — 37000008 HC ANESTHESIA 1ST 15 MINUTES: Performed by: OBSTETRICS & GYNECOLOGY

## 2025-06-25 PROCEDURE — 25000003 PHARM REV CODE 250: Performed by: NURSE ANESTHETIST, CERTIFIED REGISTERED

## 2025-06-25 PROCEDURE — 27201423 OPTIME MED/SURG SUP & DEVICES STERILE SUPPLY: Performed by: OBSTETRICS & GYNECOLOGY

## 2025-06-25 PROCEDURE — 36000709 HC OR TIME LEV III EA ADD 15 MIN: Performed by: OBSTETRICS & GYNECOLOGY

## 2025-06-25 RX ORDER — ACETAMINOPHEN 10 MG/ML
INJECTION, SOLUTION INTRAVENOUS
Status: DISCONTINUED | OUTPATIENT
Start: 2025-06-25 | End: 2025-06-25

## 2025-06-25 RX ORDER — IBUPROFEN 800 MG/1
800 TABLET, FILM COATED ORAL EVERY 8 HOURS PRN
Qty: 30 TABLET | Refills: 0 | Status: SHIPPED | OUTPATIENT
Start: 2025-06-25

## 2025-06-25 RX ORDER — KETOROLAC TROMETHAMINE 30 MG/ML
INJECTION, SOLUTION INTRAMUSCULAR; INTRAVENOUS
Status: DISCONTINUED | OUTPATIENT
Start: 2025-06-25 | End: 2025-06-25

## 2025-06-25 RX ORDER — ONDANSETRON 8 MG/1
8 TABLET, ORALLY DISINTEGRATING ORAL EVERY 8 HOURS PRN
Status: DISCONTINUED | OUTPATIENT
Start: 2025-06-25 | End: 2025-06-25 | Stop reason: HOSPADM

## 2025-06-25 RX ORDER — LIDOCAINE HYDROCHLORIDE 20 MG/ML
INJECTION, SOLUTION EPIDURAL; INFILTRATION; INTRACAUDAL; PERINEURAL
Status: DISCONTINUED | OUTPATIENT
Start: 2025-06-25 | End: 2025-06-25

## 2025-06-25 RX ORDER — HYDROCODONE BITARTRATE AND ACETAMINOPHEN 10; 325 MG/1; MG/1
1 TABLET ORAL EVERY 4 HOURS PRN
Status: DISCONTINUED | OUTPATIENT
Start: 2025-06-25 | End: 2025-06-25 | Stop reason: HOSPADM

## 2025-06-25 RX ORDER — OXYCODONE AND ACETAMINOPHEN 5; 325 MG/1; MG/1
1 TABLET ORAL EVERY 4 HOURS PRN
Qty: 20 TABLET | Refills: 0 | Status: SHIPPED | OUTPATIENT
Start: 2025-06-25

## 2025-06-25 RX ORDER — ROCURONIUM BROMIDE 10 MG/ML
INJECTION, SOLUTION INTRAVENOUS
Status: DISCONTINUED | OUTPATIENT
Start: 2025-06-25 | End: 2025-06-25

## 2025-06-25 RX ORDER — PROPOFOL 10 MG/ML
VIAL (ML) INTRAVENOUS
Status: DISCONTINUED | OUTPATIENT
Start: 2025-06-25 | End: 2025-06-25

## 2025-06-25 RX ORDER — MIDAZOLAM HYDROCHLORIDE 1 MG/ML
INJECTION INTRAMUSCULAR; INTRAVENOUS
Status: DISCONTINUED | OUTPATIENT
Start: 2025-06-25 | End: 2025-06-25

## 2025-06-25 RX ORDER — FENTANYL CITRATE 50 UG/ML
INJECTION, SOLUTION INTRAMUSCULAR; INTRAVENOUS
Status: DISCONTINUED | OUTPATIENT
Start: 2025-06-25 | End: 2025-06-25

## 2025-06-25 RX ORDER — FAMOTIDINE 20 MG/1
20 TABLET, FILM COATED ORAL
Status: DISCONTINUED | OUTPATIENT
Start: 2025-06-25 | End: 2025-06-25 | Stop reason: HOSPADM

## 2025-06-25 RX ORDER — SODIUM CHLORIDE, SODIUM LACTATE, POTASSIUM CHLORIDE, CALCIUM CHLORIDE 600; 310; 30; 20 MG/100ML; MG/100ML; MG/100ML; MG/100ML
INJECTION, SOLUTION INTRAVENOUS CONTINUOUS PRN
Status: DISCONTINUED | OUTPATIENT
Start: 2025-06-25 | End: 2025-06-25

## 2025-06-25 RX ORDER — AMOXICILLIN 250 MG
1 CAPSULE ORAL 2 TIMES DAILY
Status: DISCONTINUED | OUTPATIENT
Start: 2025-06-25 | End: 2025-06-25 | Stop reason: HOSPADM

## 2025-06-25 RX ORDER — DIPHENHYDRAMINE HCL 25 MG
25 CAPSULE ORAL EVERY 4 HOURS PRN
Status: DISCONTINUED | OUTPATIENT
Start: 2025-06-25 | End: 2025-06-25 | Stop reason: HOSPADM

## 2025-06-25 RX ORDER — SODIUM CHLORIDE, SODIUM LACTATE, POTASSIUM CHLORIDE, CALCIUM CHLORIDE 600; 310; 30; 20 MG/100ML; MG/100ML; MG/100ML; MG/100ML
INJECTION, SOLUTION INTRAVENOUS CONTINUOUS
Status: DISCONTINUED | OUTPATIENT
Start: 2025-06-25 | End: 2025-06-25 | Stop reason: HOSPADM

## 2025-06-25 RX ORDER — ONDANSETRON HYDROCHLORIDE 2 MG/ML
INJECTION, SOLUTION INTRAMUSCULAR; INTRAVENOUS
Status: DISCONTINUED | OUTPATIENT
Start: 2025-06-25 | End: 2025-06-25

## 2025-06-25 RX ORDER — HYDROMORPHONE HYDROCHLORIDE 1 MG/ML
INJECTION, SOLUTION INTRAMUSCULAR; INTRAVENOUS; SUBCUTANEOUS
Status: DISCONTINUED | OUTPATIENT
Start: 2025-06-25 | End: 2025-06-25

## 2025-06-25 RX ORDER — SODIUM CHLORIDE 9 MG/ML
INJECTION, SOLUTION INTRAVENOUS CONTINUOUS
Status: DISCONTINUED | OUTPATIENT
Start: 2025-06-25 | End: 2025-06-25 | Stop reason: HOSPADM

## 2025-06-25 RX ORDER — HYDROCODONE BITARTRATE AND ACETAMINOPHEN 5; 325 MG/1; MG/1
1 TABLET ORAL EVERY 4 HOURS PRN
Status: DISCONTINUED | OUTPATIENT
Start: 2025-06-25 | End: 2025-06-25 | Stop reason: HOSPADM

## 2025-06-25 RX ORDER — DEXAMETHASONE SODIUM PHOSPHATE 4 MG/ML
INJECTION, SOLUTION INTRA-ARTICULAR; INTRALESIONAL; INTRAMUSCULAR; INTRAVENOUS; SOFT TISSUE
Status: DISCONTINUED | OUTPATIENT
Start: 2025-06-25 | End: 2025-06-25

## 2025-06-25 RX ORDER — IBUPROFEN 600 MG/1
600 TABLET, FILM COATED ORAL EVERY 6 HOURS PRN
Status: DISCONTINUED | OUTPATIENT
Start: 2025-06-25 | End: 2025-06-25 | Stop reason: HOSPADM

## 2025-06-25 RX ADMIN — ACETAMINOPHEN 1000 MG: 10 INJECTION, SOLUTION INTRAVENOUS at 08:06

## 2025-06-25 RX ADMIN — DEXMEDETOMIDINE 8 MCG: 200 INJECTION, SOLUTION INTRAVENOUS at 09:06

## 2025-06-25 RX ADMIN — MIDAZOLAM HYDROCHLORIDE 2 MG: 1 INJECTION, SOLUTION INTRAMUSCULAR; INTRAVENOUS at 08:06

## 2025-06-25 RX ADMIN — PROPOFOL 160 MG: 10 INJECTION, EMULSION INTRAVENOUS at 08:06

## 2025-06-25 RX ADMIN — FENTANYL CITRATE 100 MCG: 0.05 INJECTION, SOLUTION INTRAMUSCULAR; INTRAVENOUS at 08:06

## 2025-06-25 RX ADMIN — KETOROLAC TROMETHAMINE 30 MG: 30 INJECTION, SOLUTION INTRAMUSCULAR at 09:06

## 2025-06-25 RX ADMIN — ONDANSETRON 8 MG: 2 INJECTION INTRAMUSCULAR; INTRAVENOUS at 08:06

## 2025-06-25 RX ADMIN — ROCURONIUM BROMIDE 10 MG: 10 INJECTION, SOLUTION INTRAVENOUS at 09:06

## 2025-06-25 RX ADMIN — SODIUM CHLORIDE, SODIUM LACTATE, POTASSIUM CHLORIDE, AND CALCIUM CHLORIDE: .6; .31; .03; .02 INJECTION, SOLUTION INTRAVENOUS at 08:06

## 2025-06-25 RX ADMIN — GLYCOPYRROLATE 0.4 MG: 0.2 INJECTION, SOLUTION INTRAMUSCULAR; INTRAVENOUS at 09:06

## 2025-06-25 RX ADMIN — ROCURONIUM BROMIDE 10 MG: 10 INJECTION, SOLUTION INTRAVENOUS at 08:06

## 2025-06-25 RX ADMIN — DEXMEDETOMIDINE 4 MCG: 200 INJECTION, SOLUTION INTRAVENOUS at 09:06

## 2025-06-25 RX ADMIN — LIDOCAINE HYDROCHLORIDE 80 MG: 20 INJECTION, SOLUTION EPIDURAL; INFILTRATION; INTRACAUDAL; PERINEURAL at 08:06

## 2025-06-25 RX ADMIN — ROCURONIUM BROMIDE 40 MG: 10 INJECTION, SOLUTION INTRAVENOUS at 08:06

## 2025-06-25 RX ADMIN — SUGAMMADEX 200 MG: 100 INJECTION, SOLUTION INTRAVENOUS at 09:06

## 2025-06-25 RX ADMIN — FENTANYL CITRATE 50 MCG: 0.05 INJECTION, SOLUTION INTRAMUSCULAR; INTRAVENOUS at 08:06

## 2025-06-25 RX ADMIN — DEXAMETHASONE SODIUM PHOSPHATE 8 MG: 4 INJECTION, SOLUTION INTRAMUSCULAR; INTRAVENOUS at 08:06

## 2025-06-25 RX ADMIN — HYDROMORPHONE HYDROCHLORIDE 1 MG: 1 INJECTION, SOLUTION INTRAMUSCULAR; INTRAVENOUS; SUBCUTANEOUS at 09:06

## 2025-06-25 NOTE — ANESTHESIA POSTPROCEDURE EVALUATION
Anesthesia Post Evaluation    Patient: Brenda Perez    Procedure(s) Performed: Procedure(s) (LRB):  LAPAROSCOPY, DIAGNOSTIC (N/A)  DESTRUCTION, ENDOMETRIOSIS  LYSIS, ADHESIONS, LAPAROSCOPIC    Final Anesthesia Type: general      Patient location during evaluation: PACU  Patient participation: Yes- Able to Participate  Level of consciousness: awake and alert, oriented and awake  Post-procedure vital signs: reviewed and stable  Pain management: adequate  Airway patency: patent    PONV status at discharge: No PONV  Anesthetic complications: no      Cardiovascular status: blood pressure returned to baseline  Respiratory status: unassisted, spontaneous ventilation and room air  Hydration status: euvolemic  Follow-up not needed.  Comments: [facility]              Vitals Value Taken Time   /61 06/25/25 10:42   Temp 36.1 °C (97 °F) 06/25/25 10:00   Pulse 71 06/25/25 10:43   Resp 17 06/25/25 10:44   SpO2 96 % 06/25/25 10:43   Vitals shown include unfiled device data.      No case tracking events are documented in the log.      Pain/Yudelka Score: Yudelka Score: 9 (6/25/2025 10:40 AM)

## 2025-06-25 NOTE — TRANSFER OF CARE
Anesthesia Transfer of Care Note    Patient: Brenda Perez    Procedure(s) Performed: Procedure(s) (LRB):  LAPAROSCOPY, DIAGNOSTIC (N/A)  DESTRUCTION, ENDOMETRIOSIS  LYSIS, ADHESIONS, LAPAROSCOPIC    Patient location: PACU    Anesthesia Type: general    Transport from OR: Transported from OR on 6-10 L/min O2 by face mask with adequate spontaneous ventilation    Post pain: adequate analgesia    Post assessment: no apparent anesthetic complications and tolerated procedure well    Post vital signs: stable    Level of consciousness: sedated    Nausea/Vomiting: no nausea/vomiting    Complications: none    Transfer of care protocol was followed      Last vitals: Visit Vitals  BP (!) 140/91 (BP Location: Left arm, Patient Position: Lying)   Pulse 82   Temp 36.3 °C (97.3 °F) (Skin)   Resp 14   SpO2 99%   Breastfeeding No

## 2025-06-25 NOTE — OP NOTE
Date: 2025    Procedure: Diagnostic laparoscopy, lysis of adhesions, fulguration of endometriosis     Surgeon: Marley Bridges MD    Pre-op Dx: Chronic pelvic pain, endometriosis    Post-op Dx: same    Anesthesia: GETA    EBL: 5 cc    DVT prophylaxis: Bilateral sequential compression devices    Perioperative antibiotics: none    Specimen: none    Operative Findings: Endometriosis in posterior cul de sac on the left uterosacral ligament and left pelvic side wall    Complications: none    Brief HPI: Brenda Perez is a 20 y.o. female  for preop examination.  She is scheduled for a diagnostic laparoscopy on 25.  Patient has known h/o endometriosis with h/o prior laparoscopy and treatment in the past.  She has been having worsening pelvic pain not improving with medical management.  Ultrasounds have been negative.     The pain is described as cramping and stabbing. Pain is located in the suprapubic area without radiation. Onset was sudden occurring 2 months ago. Symptoms have been gradually worsening since. Aggravating factors: a full bladder. Alleviating factors: none.   No dysuria.  Constipation that she feels is secondary to the pain.   She now is also having worsening pressure in the deep pelvis.   She also reports breakthrough bleeding for the past month with her OCP. Not bleeding currently.        PROCEDURE IN DETAIL:     The patient was  transferred to the Operating Room, where general endotracheal anesthesia was administered without complication. She was placed in the dorsal lithotomy position in [facility]. The patient's abdomen and perineum were prepped in the normal sterile fashion. An in and out catheter was used to drain the bladder. The patient was draped.  A 5 mm skin incision was made in the patient's infraumbilical region. Veress needle was inserted into this location with proper intraperitoneal placement, confirmed with the saline drop test and low intraabdominal  pressure.  The abdomen was   insufflated. The 5 mm infraumbilical trocar was inserted under direct laparoscopic visualization. Next, two 5 mm skin incisions were made in the patient's lower lateral abdomen bilaterally. 5 mm trocars were placed in the direct laparoscopic visualization. The patient was placed in steep Trendelenburg position. The abdomen and pelvis were surveyed with the above noted findings.   The Ligasure was used to remove adhesions of the bowel from the left abdominal side wall.  Endometriosis and scar was fulgurated with the Ligasure hook.  The uterus was then elevated and all visible endometriosis implants were fulgurated with the Ligasure hook in the posterior cul de sac.  The pelvis was irrigated. Next, the ports were removed under direct laparoscopic visualization with excellent hemostasis noted. Skin incisions were closed using Dermoplast. All instruments were removed. Sponge and instrument count was correct x2. The patient tolerated the procedure well and was transferred to recovery in stable condition.

## 2025-06-25 NOTE — ANESTHESIA PROCEDURE NOTES
Intubation    Date/Time: 6/25/2025 8:47 AM    Performed by: Manuel Day CRNA  Authorized by: Manuel Day CRNA    Intubation:     Induction:  Intravenous    Intubated:  Postinduction    Mask Ventilation:  Easy mask    Attempts:  1    Attempted By:  CRNA    Method of Intubation:  Direct    Blade:  Lazcano 4    Laryngeal View Grade: Grade I - full view of cords      Difficult Airway Encountered?: No      Complications:  None    Airway Device:  Direct and oral endotracheal tube    Airway Device Size:  7.5    Style/Cuff Inflation:  Cuffed (inflated to minimal occlusive pressure)    Tube secured:  20    Secured at:  The lips    Placement Verified By:  Auscultation    Complicating Factors:  None    Findings Post-Intubation:  Bilateral breath sounds, positive ETCO2 and atraumatic / condition of teeth unchanged

## 2025-06-25 NOTE — DISCHARGE SUMMARY
[facility]  Obstetrics & Gynecology  Discharge Summary    Patient Name: Brenda Perez  MRN: [id number]  Admission Date: 2025  Hospital Length of Stay: 0 days  Discharge Date and Time: 2025 9:58 AM  Attending Physician: Marley Bridges MD   Discharging Provider: Marley Bridges MD  Primary Care Provider: Dennys Feng MD    HPI:  Brenda Perez is a 20 y.o. female  for preop examination.  She is scheduled for a diagnostic laparoscopy on 25.  Patient has known h/o endometriosis with h/o prior laparoscopy and treatment in the past.  She has been having worsening pelvic pain not improving with medical management.  Ultrasounds have been negative.     The pain is described as cramping and stabbing. Pain is located in the suprapubic area without radiation. Onset was sudden occurring 2 months ago. Symptoms have been gradually worsening since. Aggravating factors: a full bladder. Alleviating factors: none.   No dysuria.  Constipation that she feels is secondary to the pain.   She now is also having worsening pressure in the deep pelvis.   She also reports breakthrough bleeding for the past month with her OCP. Not bleeding currently.      Hospital Course:  Patient presented for scheduled surgery, see op note.  She was transferred to PACU to recover.  No acute events.  She was discharged home when meeting all discharge criteria.      Goals of Care Treatment Preferences:  Code Status: Full Code      Procedure(s) (LRB):  LAPAROSCOPY, DIAGNOSTIC (N/A)  DESTRUCTION, ENDOMETRIOSIS  LYSIS, ADHESIONS, LAPAROSCOPIC         Significant Diagnostic Studies: N/A      Pending Diagnostic Studies:       None          Final Active Diagnoses:    Diagnosis Date Noted POA    PRINCIPAL PROBLEM:  Chronic pelvic pain in female [R10.2, G89.29] 2025 Yes    Endometriosis [N80.9] 2025 Yes      Problems Resolved During this Admission:        Discharged Condition: good    Disposition:  home    Follow Up:   Follow-up Information       Marley Bridges MD Follow up in 2 week(s).    Specialty: Obstetrics and Gynecology  Contact information:  [address] [place], [place] [address] Madhu RUIZ [id number] [phone]                           Patient Instructions:      Diet general     Wound care routine (specify)   Order Comments: Wound care routine:   1. Keep clean and dry  2. Showers only  3. No creams/ointment  4. Remove soiled sterisrips     Call MD for:  temperature >100.4     Call MD for:  persistent nausea and vomiting     Call MD for:  severe uncontrolled pain     Call MD for:  difficulty breathing, headache or visual disturbances     Call MD for:  redness, tenderness, or signs of infection (pain, swelling, redness, odor or green/yellow discharge around incision site)     Call MD for:  hives     Call MD for:  persistent dizziness or light-headedness     Call MD for:  extreme fatigue     Call MD for:     Medications:  Reconciled Home Medications:      Medication List        START taking these medications      ibuprofen 800 MG tablet  Commonly known as: ADVIL,MOTRIN  Take 1 tablet (800 mg total) by mouth every 8 (eight) hours as needed for Pain.     oxyCODONE-acetaminophen 5-325 mg per tablet  Commonly known as: PERCOCET  Take 1 tablet by mouth every 4 (four) hours as needed for Pain.            CONTINUE taking these medications      azelastine-fluticasone 137-50 mcg/spray Spry nassal spray  Commonly known as: DYMISTA  1 spray by Each Nostril route 2 (two) times daily as needed.     levocetirizine 5 MG tablet  Commonly known as: XYZAL  Take 5 mg by mouth daily as needed.     NIFEdipine 60 MG Tbsr  Commonly known as: ADALAT CC  Take 60 mg by mouth once daily.     SLYND 4 mg (28) Tab  Generic drug: drospirenone (contraceptive)  Take 1 tablet (4 mg total) by mouth once daily.              Marley Bridges MD  Obstetrics & Gynecology  [facility]

## 2025-06-25 NOTE — ANESTHESIA PREPROCEDURE EVALUATION
06/25/2025  Brenda Perez is a 20 y.o., female.      Pre-op Assessment    I have reviewed the Patient Summary Reports.     I have reviewed the Nursing Notes. I have reviewed the NPO Status.      Review of Systems  Anesthesia Hx:  No problems with previous Anesthesia             Denies Family Hx of Anesthesia complications.    Denies Personal Hx of Anesthesia complications.                    Hematology/Oncology:  Hematology Normal   Oncology Normal                                   EENT/Dental:  EENT/Dental Normal           Cardiovascular:     Hypertension                                          Pulmonary:  Pulmonary Normal                       Renal/:  Renal/ Normal                 Hepatic/GI:  Hepatic/GI Normal                    Musculoskeletal:  Musculoskeletal Normal                Neurological:      Headaches Seizures    Has not had a Sx since childhood and is not on any Sx medications and was discharged from her neurologist.                             Endocrine:  Endocrine Normal            Dermatological:  Skin Normal    Psych:  Psychiatric Normal                    Physical Exam  General: Cooperative and Alert    Airway:  Mallampati: II   Mouth Opening: Normal  TM Distance: Normal  Tongue: Normal  Neck ROM: Normal ROM    Chest/Lungs:  Clear to auscultation, Normal Respiratory Rate    Heart:  Rate: Normal  Rhythm: Regular Rhythm  Sounds: Normal        Anesthesia Plan  Type of Anesthesia, risks & benefits discussed:    Anesthesia Type: Gen ETT  Intra-op Monitoring Plan: Standard ASA Monitors  Post Op Pain Control Plan: multimodal analgesia and IV/PO Opioids PRN  Induction:  IV  Airway Plan: Direct  Informed Consent: Informed consent signed with the Patient and all parties understand the risks and agree with anesthesia plan.  All questions answered.   ASA Score: 2  Day of Surgery Review of  History & Physical: H&P Update referred to the surgeon/provider.I have interviewed and examined the patient. I have reviewed the patient's H&P dated: 6/25/25. There are no significant changes.     Ready For Surgery From Anesthesia Perspective.     .

## 2025-07-16 ENCOUNTER — OFFICE VISIT (OUTPATIENT)
Dept: OBSTETRICS AND GYNECOLOGY | Facility: CLINIC | Age: 20
End: 2025-07-16
Payer: MEDICAID

## 2025-07-16 VITALS
DIASTOLIC BLOOD PRESSURE: 84 MMHG | HEART RATE: 81 BPM | BODY MASS INDEX: 31.76 KG/M2 | WEIGHT: 197.63 LBS | HEIGHT: 66 IN | SYSTOLIC BLOOD PRESSURE: 132 MMHG

## 2025-07-16 DIAGNOSIS — I10 HYPERTENSION, UNSPECIFIED TYPE: ICD-10-CM

## 2025-07-16 DIAGNOSIS — N80.9 ENDOMETRIOSIS DETERMINED BY LAPAROSCOPY: ICD-10-CM

## 2025-07-16 DIAGNOSIS — Z30.011 ENCOUNTER FOR INITIAL PRESCRIPTION OF CONTRACEPTIVE PILLS: ICD-10-CM

## 2025-07-16 DIAGNOSIS — Z01.419 WELL WOMAN EXAM WITH ROUTINE GYNECOLOGICAL EXAM: Primary | ICD-10-CM

## 2025-07-16 PROBLEM — O40.3XX0 POLYHYDRAMNIOS IN THIRD TRIMESTER: Status: RESOLVED | Noted: 2025-01-14 | Resolved: 2025-07-16

## 2025-07-16 PROCEDURE — 99999 PR PBB SHADOW E&M-EST. PATIENT-LVL III: CPT | Mod: PBBFAC,,, | Performed by: OBSTETRICS & GYNECOLOGY

## 2025-07-16 PROCEDURE — 99213 OFFICE O/P EST LOW 20 MIN: CPT | Mod: PBBFAC | Performed by: OBSTETRICS & GYNECOLOGY

## 2025-07-16 RX ORDER — DROSPIRENONE 4 MG/1
4 TABLET, FILM COATED ORAL DAILY
Qty: 28 TABLET | Refills: 10 | Status: SHIPPED | OUTPATIENT
Start: 2025-07-16

## 2025-07-16 NOTE — PROGRESS NOTES
Subjective:    Patient ID: Brenda Perez is a 20 y.o. y.o. female.     Chief Complaint: Annual Well Woman Exam     History of Present Illness:  Brenda presents today for Annual Well Woman exam. She describes her menses as regular every month without intermenstrual spotting.She denies pelvic pain.  She denies breast tenderness, masses, nipple discharge. She reports GYN complaints of some vaginal pain - appropriate post op after fulguration of endometriosis implants in cul de sac. She denies difficulty with urination or bowel movements. She denies bloating, early satiety, or weight changes. She is sexually active. Contraception is by oral progesterone-only contraceptive.      Menstrual History:   Patient's last menstrual period was 2025..     OB History          3    Para   3    Term   3            AB        Living   3         SAB        IAB        Ectopic        Multiple   0    Live Births   3                 The following portions of the patient's history were reviewed and updated as appropriate: allergies, current medications, past family history, past medical history, past social history, past surgical history, and problem list.    ROS:   Review of Systems   Constitutional:  Negative for chills, diaphoresis, fatigue, fever and unexpected weight change.   HENT:  Negative for congestion, hearing loss, postnasal drip, rhinorrhea, sinus pressure, sinus pain, sore throat and tinnitus.    Eyes:  Negative for pain, discharge and visual disturbance.   Respiratory:  Negative for apnea, cough, shortness of breath and wheezing.    Cardiovascular:  Negative for chest pain, palpitations and leg swelling.   Gastrointestinal:  Negative for abdominal pain, constipation, diarrhea, nausea and vomiting.   Endocrine: Negative for cold intolerance, heat intolerance, polydipsia, polyphagia and polyuria.   Genitourinary:  Positive for vaginal pain. Negative for difficulty urinating, dyspareunia, dysuria,  enuresis, flank pain, frequency, genital sores, hematuria, menstrual problem, pelvic pain, urgency, vaginal bleeding and vaginal discharge.   Musculoskeletal:  Negative for arthralgias, back pain, joint swelling, myalgias, neck pain and neck stiffness.   Skin:  Negative for color change, pallor and rash.   Allergic/Immunologic: Negative for environmental allergies, food allergies and immunocompromised state.   Neurological:  Negative for dizziness, weakness, light-headedness, numbness and headaches.   Hematological:  Negative for adenopathy. Does not bruise/bleed easily.   Psychiatric/Behavioral:  Negative for agitation and confusion. The patient is not nervous/anxious.          Objective:    Vital Signs:  Vitals:    07/16/25 1414   BP: 132/84   Pulse: 81       Physical Exam:   Examination performed with Chaperone present  General:  alert, cooperative, no distress   Skin:  Skin color, texture, turgor normal. No rashes or lesions   HEENT:  extra ocular movement intact, sclera clear, anicteric   Neck: supple, trachea midline, no adenopathy or thyromegally   Respiratory:  Normal effort   Breasts:  no discharge, erythema, tenderness, or palpable masses; no axillary lymphadenopathy   Abdomen:  soft, nontender, no palpable masses   Pelvis: External genitalia: normal general appearance  Urinary system: urethral meatus normal, bladder nontender  Vaginal: normal mucosa without prolapse or lesions  Cervix: normal appearance  Uterus: normal size, shape, position  Adnexa: normal size, nontender bilaterally   Extremities: Normal ROM; no edema, no cyanosis   Neurologial: Normal strength and tone. No focal numbness or weakness.   Psychiatric: normal mood, speech, dress, and thought processes         Assessment:       Healthy female exam.     1. Well woman exam with routine gynecological exam    2. Endometriosis determined by laparoscopy    3. Encounter for initial prescription of contraceptive pills    4. Hypertension, unspecified  type          Plan:      Well woman exam with routine gynecological exam    Endometriosis determined by laparoscopy    Encounter for initial prescription of contraceptive pills  -     drospirenone, contraceptive, (SLYND) 4 mg (28) Tab; Take 1 tablet (4 mg total) by mouth once daily.  Dispense: 28 tablet; Refill: 10    Hypertension, unspecified type  -     drospirenone, contraceptive, (SLYND) 4 mg (28) Tab; Take 1 tablet (4 mg total) by mouth once daily.  Dispense: 28 tablet; Refill: 10      Pap 6/24 NILM    COUNSELING:  Brenda was counseled on use and side-effects of various contraceptive measures, A.C.O.G. Pap guidelines and recommendations for yearly pelvic exams in addition to recommendations for monthly self breast exams; to see her PCP for other health maintenance.

## (undated) DEVICE — TRAP FLUID SMOKE EVACUATOR

## (undated) DEVICE — SOL NACL 0.9% IV INJ 1000ML

## (undated) DEVICE — TROCAR ENDO Z THREAD KII 5X100

## (undated) DEVICE — TROCAR KII FIOS ZTHREAD 11X100

## (undated) DEVICE — GLOVE 6.0 PROTEXIS PI MICRO

## (undated) DEVICE — NDL INSUF ULTRA VERESS 120MM

## (undated) DEVICE — IRRIGATOR ENDOSCOPY DISP.

## (undated) DEVICE — GLOVE 6.5 PROTEXIS PI BLUE

## (undated) DEVICE — APPLICATOR CHLORAPREP ORN 26ML

## (undated) DEVICE — TUBING LAPARSCOPIC INSUFFLATIN

## (undated) DEVICE — GOWN POLY REINF BRTH SLV XL

## (undated) DEVICE — SUT CTD VICRYL 3-0 PS-1

## (undated) DEVICE — CANNULA LAP SEAL Z THRD 5X100

## (undated) DEVICE — Device

## (undated) DEVICE — SEALER LIGASURE L-HOOK 37CM